# Patient Record
Sex: MALE | Race: WHITE | Employment: OTHER | URBAN - METROPOLITAN AREA
[De-identification: names, ages, dates, MRNs, and addresses within clinical notes are randomized per-mention and may not be internally consistent; named-entity substitution may affect disease eponyms.]

---

## 2019-08-22 ENCOUNTER — TELEPHONE (OUTPATIENT)
Dept: FAMILY MEDICINE CLINIC | Facility: CLINIC | Age: 79
End: 2019-08-22

## 2019-08-22 NOTE — TELEPHONE ENCOUNTER
Called patient's only phone number and it goes to an access code. Need to cancel appt 9/3/19 at 9 am. [facility] with the son for the patient to call.

## 2019-09-13 ENCOUNTER — LAB ENCOUNTER (OUTPATIENT)
Dept: LAB | Age: 79
End: 2019-09-13
Attending: FAMILY MEDICINE
Payer: MEDICARE

## 2019-09-13 ENCOUNTER — OFFICE VISIT (OUTPATIENT)
Dept: FAMILY MEDICINE CLINIC | Facility: CLINIC | Age: 79
End: 2019-09-13
Payer: MEDICARE

## 2019-09-13 VITALS
DIASTOLIC BLOOD PRESSURE: 72 MMHG | RESPIRATION RATE: 16 BRPM | HEIGHT: 61 IN | OXYGEN SATURATION: 99 % | TEMPERATURE: 98 F | BODY MASS INDEX: 19.03 KG/M2 | SYSTOLIC BLOOD PRESSURE: 112 MMHG | WEIGHT: 100.81 LBS | HEART RATE: 78 BPM

## 2019-09-13 DIAGNOSIS — R63.6 LOW WEIGHT: ICD-10-CM

## 2019-09-13 DIAGNOSIS — Z28.21 IMMUNIZATION NOT CARRIED OUT BECAUSE OF PATIENT REFUSAL: ICD-10-CM

## 2019-09-13 DIAGNOSIS — Z11.4 SCREENING FOR HIV (HUMAN IMMUNODEFICIENCY VIRUS): ICD-10-CM

## 2019-09-13 DIAGNOSIS — R79.89 ELEVATED FERRITIN: Primary | ICD-10-CM

## 2019-09-13 DIAGNOSIS — Z00.00 ENCOUNTER FOR MEDICARE ANNUAL WELLNESS EXAM: ICD-10-CM

## 2019-09-13 DIAGNOSIS — Z13.31 DEPRESSION SCREENING: ICD-10-CM

## 2019-09-13 DIAGNOSIS — Z00.00 ENCOUNTER FOR ANNUAL HEALTH EXAMINATION: ICD-10-CM

## 2019-09-13 DIAGNOSIS — Z23 FLU VACCINE NEED: ICD-10-CM

## 2019-09-13 DIAGNOSIS — Z13.39 SCREENING FOR ALCOHOL PROBLEM: ICD-10-CM

## 2019-09-13 DIAGNOSIS — Z23 NEED FOR VACCINATION: ICD-10-CM

## 2019-09-13 LAB
ALBUMIN SERPL-MCNC: 4.4 G/DL (ref 3.4–5)
ALBUMIN/GLOB SERPL: 1.4 {RATIO} (ref 1–2)
ALP LIVER SERPL-CCNC: 62 U/L (ref 45–117)
ALT SERPL-CCNC: 35 U/L (ref 16–61)
ANION GAP SERPL CALC-SCNC: 2 MMOL/L (ref 0–18)
AST SERPL-CCNC: 28 U/L (ref 15–37)
BILIRUB SERPL-MCNC: 0.8 MG/DL (ref 0.1–2)
BUN BLD-MCNC: 20 MG/DL (ref 7–18)
BUN/CREAT SERPL: 21.7 (ref 10–20)
CALCIUM BLD-MCNC: 9.7 MG/DL (ref 8.5–10.1)
CHLORIDE SERPL-SCNC: 107 MMOL/L (ref 98–112)
CHOLEST SMN-MCNC: 139 MG/DL (ref ?–200)
CO2 SERPL-SCNC: 30 MMOL/L (ref 21–32)
CREAT BLD-MCNC: 0.92 MG/DL (ref 0.7–1.3)
DEPRECATED HBV CORE AB SER IA-ACNC: 617.4 NG/ML (ref 30–530)
EST. AVERAGE GLUCOSE BLD GHB EST-MCNC: 128 MG/DL (ref 68–126)
GLOBULIN PLAS-MCNC: 3.2 G/DL (ref 2.8–4.4)
GLUCOSE BLD-MCNC: 96 MG/DL (ref 70–99)
HBA1C MFR BLD HPLC: 6.1 % (ref ?–5.7)
HDLC SERPL-MCNC: 38 MG/DL (ref 40–59)
IRON SATURATION: 33 % (ref 20–50)
IRON SERPL-MCNC: 119 UG/DL (ref 65–175)
LDLC SERPL CALC-MCNC: 87 MG/DL (ref ?–100)
M PROTEIN MFR SERPL ELPH: 7.6 G/DL (ref 6.4–8.2)
NONHDLC SERPL-MCNC: 101 MG/DL (ref ?–130)
OSMOLALITY SERPL CALC.SUM OF ELEC: 290 MOSM/KG (ref 275–295)
POTASSIUM SERPL-SCNC: 4.9 MMOL/L (ref 3.5–5.1)
PSA SERPL-MCNC: 2.4 NG/ML (ref ?–4)
SODIUM SERPL-SCNC: 139 MMOL/L (ref 136–145)
T4 FREE SERPL-MCNC: 1.2 NG/DL (ref 0.8–1.7)
TOTAL IRON BINDING CAPACITY: 359 UG/DL (ref 240–450)
TRANSFERRIN SERPL-MCNC: 241 MG/DL (ref 200–360)
TRIGL SERPL-MCNC: 68 MG/DL (ref 30–149)
TSI SER-ACNC: 2 MIU/ML (ref 0.36–3.74)
VLDLC SERPL CALC-MCNC: 14 MG/DL (ref 0–30)

## 2019-09-13 PROCEDURE — 82728 ASSAY OF FERRITIN: CPT | Performed by: FAMILY MEDICINE

## 2019-09-13 PROCEDURE — 84439 ASSAY OF FREE THYROXINE: CPT

## 2019-09-13 PROCEDURE — 84443 ASSAY THYROID STIM HORMONE: CPT

## 2019-09-13 PROCEDURE — 83550 IRON BINDING TEST: CPT | Performed by: FAMILY MEDICINE

## 2019-09-13 PROCEDURE — 36415 COLL VENOUS BLD VENIPUNCTURE: CPT | Performed by: FAMILY MEDICINE

## 2019-09-13 PROCEDURE — 83036 HEMOGLOBIN GLYCOSYLATED A1C: CPT

## 2019-09-13 PROCEDURE — 83540 ASSAY OF IRON: CPT | Performed by: FAMILY MEDICINE

## 2019-09-13 PROCEDURE — G0439 PPPS, SUBSEQ VISIT: HCPCS | Performed by: FAMILY MEDICINE

## 2019-09-13 PROCEDURE — 99497 ADVNCD CARE PLAN 30 MIN: CPT | Performed by: FAMILY MEDICINE

## 2019-09-13 PROCEDURE — 80053 COMPREHEN METABOLIC PANEL: CPT | Performed by: FAMILY MEDICINE

## 2019-09-13 PROCEDURE — 84153 ASSAY OF PSA TOTAL: CPT

## 2019-09-13 PROCEDURE — 80061 LIPID PANEL: CPT | Performed by: FAMILY MEDICINE

## 2019-09-13 PROCEDURE — 87389 HIV-1 AG W/HIV-1&-2 AB AG IA: CPT

## 2019-09-13 RX ORDER — ASPIRIN 81 MG/1
81 TABLET ORAL DAILY
Qty: 365 TABLET | Refills: 0 | Status: SHIPPED | OUTPATIENT
Start: 2019-09-13

## 2019-09-13 NOTE — PROGRESS NOTES
HPI:   Orvan Hodgkin is a 66year old male who presents for a Medicare Subsequent Annual Wellness visit (Pt already had Initial Annual Wellness).       His last annual assessment has been over 1 year: Annual Physical due on 10/08/1943       Known hx o Alcohol abuse and had a score of 0 so is at low risk.      Patient Care Team: Patient Care Team:  Orlando Dooley MD as PCP - General (Family Practice)    Patient Active Problem List:     Anxiety state, unspecified    Wt Readings from Last 3 Encounters: reports that he does not drink alcohol or use drugs.      REVIEW OF SYSTEMS:   GENERAL: feels well otherwise  SKIN: denies any unusual skin lesions  EYES: denies blurred vision or double vision  HEENT: denies nasal congestion, sinus pain or ST  LUNGS: denie S1, S2 normal, no murmur, rub or gallop   Abdomen:   Soft, non-tender, bowel sounds active all four quadrants,  no masses, no organomegaly   Extremities: Extremities normal, atraumatic, no cyanosis or edema   Pulses: 2+ and symmetric   Skin: Skin color, te Cancel: CREATINE KINASE (CK), MB  -     Cancel: RHEUMATOID ARTHRITIS FACTOR  -     PNEUMOCOCCAL VACC, 13 KYLE IM         Diet assessment: good     PLAN:  The patient indicates understanding of these issues and agrees to the plan.   Reinforced healthy diet preventive care reminders to display for this patient.   Update Health Maintenance if applicable     Immunizations (Update Immunization Activity if applicable)     Influenza  Covered Annually    Please get every year    Pneumococcal 13 (Prevnar)  Covered On

## 2019-09-13 NOTE — PATIENT INSTRUCTIONS
Zeyad Ramos's SCREENING SCHEDULE   Tests on this list are recommended by your physician but may not be covered, or covered at this frequency, by your insurer. Please check with your insurance carrier before scheduling to verify coverage.     PREVENT Covered every 5 years No results found for this or any previous visit. No flowsheet data found. Fecal Occult Blood   Covered Annually No results found for: FOB, OCCULTSTOOL No flowsheet data found.      Barium Enema-   uncomfortable but covered  Covered this or any previous visit. This may be covered with your pharmacy  prescription benefits     Recommended Websites for Advanced Directives    [email or web address]  An information packet, including necessary form from

## 2019-09-16 ENCOUNTER — TELEPHONE (OUTPATIENT)
Dept: FAMILY MEDICINE CLINIC | Facility: CLINIC | Age: 79
End: 2019-09-16

## 2019-09-16 NOTE — TELEPHONE ENCOUNTER
Pt is concerned about results from his 9/13/2019 blood draw. I let him know that they have not been reviewed yet by the MD.     He insists on getting a phone call back upon MD resulting the labs.

## 2019-09-17 ENCOUNTER — TELEPHONE (OUTPATIENT)
Dept: FAMILY MEDICINE CLINIC | Facility: CLINIC | Age: 79
End: 2019-09-17

## 2019-09-17 ENCOUNTER — APPOINTMENT (OUTPATIENT)
Dept: LAB | Age: 79
End: 2019-09-17
Attending: FAMILY MEDICINE
Payer: MEDICARE

## 2019-09-17 DIAGNOSIS — R79.89 ELEVATED FERRITIN: Primary | ICD-10-CM

## 2019-09-17 NOTE — TELEPHONE ENCOUNTER
Patients daughter states that patients previous PCP used to order a CEA due to abnormal ferritin level and patients age. States patient wants this test ordered.    States patient brought in the list of the test he needed done and this was one of test. Les

## 2019-09-17 NOTE — TELEPHONE ENCOUNTER
Patient picked up lab results print out. Unsure of what patient needs. Language Barrier present. Attempted to review lab results with patient, patient does not seem to understand the results. Contacted patients daughter who will contact patient.  She will

## 2019-09-17 NOTE — TELEPHONE ENCOUNTER
Pt states he didn't get a call regarding results, it states on labs results that he did yesterday. He stated that he talked to someone this morning. Please advise as patient is confused.

## 2019-09-18 ENCOUNTER — TELEPHONE (OUTPATIENT)
Dept: FAMILY MEDICINE CLINIC | Facility: CLINIC | Age: 79
End: 2019-09-18

## 2019-09-18 NOTE — TELEPHONE ENCOUNTER
Pt is calling because he states he was told someone will call him with the results of the CBC results. I let pt know that MD has not finalized the results and once that happens he will get a phone call from our nurses.     I am not sure if pt understands

## 2019-09-19 ENCOUNTER — TELEPHONE (OUTPATIENT)
Dept: FAMILY MEDICINE CLINIC | Facility: CLINIC | Age: 79
End: 2019-09-19

## 2019-09-19 NOTE — TELEPHONE ENCOUNTER
Patient is requesting his lab results. Lab results discussed with Patient in detail. Patient verbalized understanding with intent to comply.     Notes recorded by Fabio Self DO on 9/18/2019 at 7:57 PM CDT  Platelets are tad under normal but he is asy

## 2019-10-07 ENCOUNTER — APPOINTMENT (OUTPATIENT)
Dept: LAB | Age: 79
End: 2019-10-07
Attending: INTERNAL MEDICINE
Payer: MEDICARE

## 2019-10-07 DIAGNOSIS — R79.89 ELEVATED FERRITIN: ICD-10-CM

## 2019-10-07 PROCEDURE — 82378 CARCINOEMBRYONIC ANTIGEN: CPT

## 2019-10-07 PROCEDURE — 36415 COLL VENOUS BLD VENIPUNCTURE: CPT

## 2019-12-05 ENCOUNTER — OFFICE VISIT (OUTPATIENT)
Dept: FAMILY MEDICINE CLINIC | Facility: CLINIC | Age: 79
End: 2019-12-05
Payer: MEDICARE

## 2019-12-05 VITALS
OXYGEN SATURATION: 99 % | HEIGHT: 61 IN | RESPIRATION RATE: 16 BRPM | TEMPERATURE: 98 F | HEART RATE: 70 BPM | DIASTOLIC BLOOD PRESSURE: 70 MMHG | SYSTOLIC BLOOD PRESSURE: 112 MMHG | BODY MASS INDEX: 19 KG/M2 | WEIGHT: 100.63 LBS

## 2019-12-05 DIAGNOSIS — R42 DIZZINESS ON STANDING: Primary | ICD-10-CM

## 2019-12-05 PROCEDURE — 99213 OFFICE O/P EST LOW 20 MIN: CPT | Performed by: FAMILY MEDICINE

## 2019-12-05 RX ORDER — MECLIZINE HYDROCHLORIDE 25 MG/1
25 TABLET ORAL 3 TIMES DAILY PRN
Qty: 30 TABLET | Refills: 0 | Status: SHIPPED | OUTPATIENT
Start: 2019-12-05 | End: 2020-01-04

## 2019-12-05 NOTE — PROGRESS NOTES
Aminata Vazquez is a 78year old male , who presents for complaints of dizziness. He reorts that two days ago he had a single episode of dizzness when he got up in the middle of the night to urinate.  Denies headache, ear pain, chest pain, vision change, pain,denies heartburn  NEURO: denies headaches and denies numbness, tingling of feet         EXAM:  /70   Pulse 70   Temp 97.9 °F (36.6 °C) (Temporal)   Resp 16   Ht 61\"   Wt 100 lb 9.6 oz (45.6 kg)   SpO2 99%   BMI 19.01 kg/m²    GENERAL: well deve

## 2019-12-30 NOTE — TELEPHONE ENCOUNTER
triamcinolone acetonide (KENALOG) 0.1 % Apply Externally Cream    [id number] - [address], [phone]

## 2020-01-03 NOTE — TELEPHONE ENCOUNTER
Requested Prescriptions     Pending Prescriptions Disp Refills   • triamcinolone acetonide 0.1 % External Cream 45 g 3     Non protocol medication    LOV: 12/5/2019 for dizziness  RTC: PRN    Filled: 10/11/2013 #45g with 3 refills    Future Appointments

## 2020-01-04 ENCOUNTER — OFFICE VISIT (OUTPATIENT)
Dept: FAMILY MEDICINE CLINIC | Facility: CLINIC | Age: 80
End: 2020-01-04
Payer: MEDICARE

## 2020-01-04 VITALS
DIASTOLIC BLOOD PRESSURE: 70 MMHG | HEART RATE: 86 BPM | RESPIRATION RATE: 16 BRPM | WEIGHT: 100 LBS | OXYGEN SATURATION: 99 % | HEIGHT: 61 IN | SYSTOLIC BLOOD PRESSURE: 118 MMHG | BODY MASS INDEX: 18.88 KG/M2 | TEMPERATURE: 98 F

## 2020-01-04 DIAGNOSIS — K59.00 CONSTIPATION, UNSPECIFIED CONSTIPATION TYPE: Primary | ICD-10-CM

## 2020-01-04 PROCEDURE — 99213 OFFICE O/P EST LOW 20 MIN: CPT | Performed by: FAMILY MEDICINE

## 2020-01-04 RX ORDER — POLYETHYLENE GLYCOL 3350 17 G/17G
17 POWDER, FOR SOLUTION ORAL DAILY
Qty: 119 G | Refills: 0 | Status: SHIPPED | OUTPATIENT
Start: 2020-01-04 | End: 2020-01-11

## 2020-01-04 RX ORDER — SENNA AND DOCUSATE SODIUM 50; 8.6 MG/1; MG/1
1 TABLET, FILM COATED ORAL DAILY
Qty: 30 TABLET | Refills: 0 | Status: SHIPPED | OUTPATIENT
Start: 2020-01-04 | End: 2020-02-03

## 2020-01-04 NOTE — PATIENT INSTRUCTIONS
Constipation (Adult)  Constipation means that you have bowel movements that are less frequent than usual. Stools often become very hard and difficult to pass. Constipation is very common. At some point in life, it affects almost everyone.  Since everyone All treatment should be done after talking with your healthcare provider. This is especially true if you have another medical problems, are taking prescription medicines, or are an older adult. Treatment most often involves lifestyle changes.  You may also Call your healthcare provider right away if any of these occur:  · Fever of 100.4°F (38°C) or higher, or as directed by your healthcare provider  · Failure to resume normal bowel movements  · Pain in your abdomen or back gets worse  · Nausea or vomiting  · Exercise helps improve the working of your colon which helps ease constipation. Try to get some physical activity every day. If you haven’t been active for a while, talk to your healthcare provider before starting again.   Laxatives  Your healthcare provide

## 2020-01-04 NOTE — PROGRESS NOTES
HPI:   Naye Olguin is a 78year old male who presents for complaints of constipation. Pt has had for 2 weeks. . BM's are described as hard and patient has a great deal of straining. Pt does get crampy abdominal pain at times.  Pt admits to not eating Mother    • High Cholesterol Brother    • Diabetes Brother         REVIEW OF SYSTEMS:   GENERAL: feels well otherwise  SKIN: denies any unusual skin lesions  EYES: denies blurred vision or double vision  HEENT: denies nasal congestion, sinus pain or ST  TARIK maintenance after that. - Senna-Docusate Sodium 8.6-50 MG Oral Tab; Take 1 tablet by mouth daily. Dispense: 30 tablet; Refill: 0  - PEG 3350 Oral Powd Pack; Take 17 g by mouth daily for 7 days.   Dispense: 119 g; Refill: 0    The patient indicates unders

## 2020-05-13 ENCOUNTER — TELEPHONE (OUTPATIENT)
Dept: FAMILY MEDICINE CLINIC | Facility: CLINIC | Age: 80
End: 2020-05-13

## 2020-05-13 NOTE — TELEPHONE ENCOUNTER
Received a note from [address] [place] [address] stating patient requested they send a request for a refill on Alclometasone Dipropionate 0.05 % Ointment which is an old prescription from another prescriber that needs to be updated.     LOV: 1/4/20 for constipation    Pe

## 2020-05-14 NOTE — TELEPHONE ENCOUNTER
- 1st outreach to schedule appt. Pt states he will call back to schedule phone visit for medications.

## 2020-11-08 DIAGNOSIS — Z00.00 LABORATORY TESTS ORDERED AS PART OF A COMPLETE PHYSICAL EXAM (CPE): Primary | ICD-10-CM

## 2020-11-09 NOTE — TELEPHONE ENCOUNTER
Name from pharmacy: Triamcinolone Acetonide 0.1 % Cre [facility]          Will file in chart as: TRIAMCINOLONE ACETONIDE 0.1 % External Cream    Sig: APPLY TO AFFECTED AREA(S) TWO TIMES A DAY AS NEEDED    Disp:  45 g    Refills:  0    Start: 11/8/2020    Class:

## 2020-12-10 ENCOUNTER — OFFICE VISIT (OUTPATIENT)
Dept: FAMILY MEDICINE CLINIC | Facility: CLINIC | Age: 80
End: 2020-12-10
Payer: MEDICARE

## 2020-12-10 VITALS
WEIGHT: 99.38 LBS | OXYGEN SATURATION: 99 % | RESPIRATION RATE: 16 BRPM | DIASTOLIC BLOOD PRESSURE: 78 MMHG | HEART RATE: 74 BPM | SYSTOLIC BLOOD PRESSURE: 128 MMHG | HEIGHT: 61 IN | TEMPERATURE: 98 F | BODY MASS INDEX: 18.76 KG/M2

## 2020-12-10 DIAGNOSIS — Z12.5 SCREENING FOR PROSTATE CANCER: ICD-10-CM

## 2020-12-10 DIAGNOSIS — Z13.1 SCREENING FOR DIABETES MELLITUS (DM): ICD-10-CM

## 2020-12-10 DIAGNOSIS — Z12.11 SCREENING FOR COLON CANCER: ICD-10-CM

## 2020-12-10 DIAGNOSIS — Z13.31 DEPRESSION SCREENING: ICD-10-CM

## 2020-12-10 DIAGNOSIS — Z13.6 SCREENING FOR CARDIOVASCULAR CONDITION: ICD-10-CM

## 2020-12-10 DIAGNOSIS — Z00.00 ENCOUNTER FOR ANNUAL HEALTH EXAMINATION: ICD-10-CM

## 2020-12-10 DIAGNOSIS — R79.89 ELEVATED FERRITIN: Primary | ICD-10-CM

## 2020-12-10 PROCEDURE — G0439 PPPS, SUBSEQ VISIT: HCPCS | Performed by: FAMILY MEDICINE

## 2020-12-10 PROCEDURE — 99497 ADVNCD CARE PLAN 30 MIN: CPT | Performed by: FAMILY MEDICINE

## 2020-12-10 NOTE — PATIENT INSTRUCTIONS
Prevention Guidelines, Men Ages 72 and Older  Screening tests and vaccines are an important part of managing your health. A screening test is done to find possible disorders or diseases in people who don't have any symptoms.  The goal is to find a disease If you choose a test other than a colonoscopy and have an abnormal test result, you will need to have a colonoscopy. Screening recommendations vary among expert groups. Talk with your healthcare provider about which tests are best for you.   Some men should Vision All men in this age group Every 1 to 2 years; if you have a chronic health condition, ask your healthcare provider if you need exams more often   Vaccine Who needs it How often   Chickenpox (varicella) All men in this age group who have no record of Diet and exercise Men who are overweight or obese When diagnosed, and then at routine exams   Fall prevention (exercise, vitamin D supplements) All men in this age group At yearly routine exams   Sexually transmitted infection Men at increased risk for inf Cardiovascular Disease Screening     Cholesterol, covered every 5 yrs including Total, LDL and Trigs LDL Cholesterol (mg/dL)   Date Value   09/13/2019 87     Cholesterol, Total (mg/dL)   Date Value   09/13/2019 139     Triglycerides (mg/dL)   Date Value Covered Annually Orders placed or performed in visit on 09/13/19   • FLU VACC HIGH DOSE PRSV FREE    Please get every year    Pneumococcal 13 (Prevnar)  Covered Once after 65 Orders placed or performed in visit on 09/13/19   • PNEUMOCOCCAL VACC, 13 KYLE IM

## 2020-12-10 NOTE — PROGRESS NOTES
HPI:   Britney Rene is a [de-identified]year old male who presents for a Medicare Subsequent Annual Wellness visit (Pt already had Initial Annual Wellness).       His last annual assessment has been over 1 year: Annual Physical due on 10/08/1943       Known hx o screening   Marion Ramos was screened for Alcohol abuse and had a score of 0 so is at low risk.      Patient Care Team: Patient Care Team:  Everette Moreno DO as PCP - General (Family Medicine)  Everette Moreno DO as PCP [facility]    Patient Active Pr HISTORY:   He  reports that he has never smoked. He has never used smokeless tobacco. He reports that he does not drink alcohol or use drugs.      REVIEW OF SYSTEMS:   GENERAL: feels well otherwise  SKIN: denies any unusual skin lesions  EYES: denies blurre to auscultation bilaterally, respirations unlabored   Chest Wall:  No tenderness or deformity   Heart:  Regular rate and rhythm, S1, S2 normal, no murmur, rub or gallop   Abdomen:   Soft, non-tender, bowel sounds active all four quadrants,  no masses, no o Interaction    This section provided for quick review of chart, separate sheet to patient  [address] Internal Lab or Procedure External Lab or Procedure   Diabetes Screening      HbgA1C   Annually HgbA1C (%)   Date Inez injectable drug abusers     Tetanus Toxoid  Only covered with a cut with metal- TD and TDaP Not covered by Medicare Part B) No vaccine history found This may be covered with your prescription benefits, but Medicare does not cover unless Medically needed

## 2020-12-12 ENCOUNTER — LAB ENCOUNTER (OUTPATIENT)
Dept: LAB | Age: 80
End: 2020-12-12
Attending: FAMILY MEDICINE
Payer: MEDICARE

## 2020-12-12 DIAGNOSIS — Z00.00 LABORATORY TESTS ORDERED AS PART OF A COMPLETE PHYSICAL EXAM (CPE): ICD-10-CM

## 2020-12-12 DIAGNOSIS — Z12.5 SCREENING FOR PROSTATE CANCER: ICD-10-CM

## 2020-12-12 DIAGNOSIS — R79.89 ELEVATED FERRITIN: ICD-10-CM

## 2020-12-12 PROCEDURE — 80061 LIPID PANEL: CPT | Performed by: FAMILY MEDICINE

## 2020-12-12 PROCEDURE — 83036 HEMOGLOBIN GLYCOSYLATED A1C: CPT

## 2020-12-12 PROCEDURE — 85025 COMPLETE CBC W/AUTO DIFF WBC: CPT | Performed by: FAMILY MEDICINE

## 2020-12-12 PROCEDURE — 82378 CARCINOEMBRYONIC ANTIGEN: CPT

## 2020-12-12 PROCEDURE — 83540 ASSAY OF IRON: CPT | Performed by: FAMILY MEDICINE

## 2020-12-12 PROCEDURE — 84439 ASSAY OF FREE THYROXINE: CPT

## 2020-12-12 PROCEDURE — 82728 ASSAY OF FERRITIN: CPT | Performed by: FAMILY MEDICINE

## 2020-12-12 PROCEDURE — 83550 IRON BINDING TEST: CPT | Performed by: FAMILY MEDICINE

## 2020-12-12 PROCEDURE — 36415 COLL VENOUS BLD VENIPUNCTURE: CPT | Performed by: FAMILY MEDICINE

## 2020-12-12 PROCEDURE — 80053 COMPREHEN METABOLIC PANEL: CPT | Performed by: FAMILY MEDICINE

## 2020-12-12 PROCEDURE — 84443 ASSAY THYROID STIM HORMONE: CPT

## 2020-12-17 DIAGNOSIS — R97.0 ELEVATED CARCINOEMBRYONIC ANTIGEN (CEA): Primary | ICD-10-CM

## 2021-01-18 DIAGNOSIS — Z00.00 LABORATORY TESTS ORDERED AS PART OF A COMPLETE PHYSICAL EXAM (CPE): ICD-10-CM

## 2021-01-18 RX ORDER — ALPRAZOLAM 1 MG/1
1 TABLET ORAL 3 TIMES DAILY
Qty: 30 TABLET | Refills: 1 | Status: SHIPPED | OUTPATIENT
Start: 2021-01-18 | End: 2021-03-01

## 2021-01-18 NOTE — TELEPHONE ENCOUNTER
Pt will need refill on the Alprazolam-he would like a 3 month prescription.  Please send to local [facility].

## 2021-01-18 NOTE — TELEPHONE ENCOUNTER
Medication last refilled on 9/24/2020 with a QTY: 90 with 3 refills. No future appointments. Medication pended if okay to refill.

## 2021-01-18 NOTE — TELEPHONE ENCOUNTER
Name from pharmacy: Triamcinolone Acetonide 0.1 % Cre [facility]          Will file in chart as: TRIAMCINOLONE ACETONIDE 0.1 % External Cream    Sig: APPLY TO AFFECTED AREA TWO TIMES A DAY AS NEEDED     Disp:  45 g    Refills:  0    Start: 1/18/2021    Class: No

## 2021-02-02 ENCOUNTER — TELEPHONE (OUTPATIENT)
Dept: FAMILY MEDICINE CLINIC | Facility: CLINIC | Age: 81
End: 2021-02-02

## 2021-02-02 NOTE — TELEPHONE ENCOUNTER
Spoke to pt's son, advised that MyChart notifications for the Covid-19 vaccine are not coming directly from our office.  Advised that [facility] is working as quickly at they can to get everyone vaccinated but it will take some time to move through [place]

## 2021-02-02 NOTE — TELEPHONE ENCOUNTER
Patient's son wants to know why he didn't get a link to schedule a vaccine when the patient's spouse did. He would like someone to look into the chart and let him know, he is planning on bringing them at the same time, please advise.

## 2021-03-01 ENCOUNTER — OFFICE VISIT (OUTPATIENT)
Dept: FAMILY MEDICINE CLINIC | Facility: CLINIC | Age: 81
End: 2021-03-01
Payer: MEDICARE

## 2021-03-01 ENCOUNTER — TELEPHONE (OUTPATIENT)
Dept: FAMILY MEDICINE CLINIC | Facility: CLINIC | Age: 81
End: 2021-03-01

## 2021-03-01 VITALS
BODY MASS INDEX: 18.88 KG/M2 | HEIGHT: 61 IN | TEMPERATURE: 97 F | RESPIRATION RATE: 16 BRPM | OXYGEN SATURATION: 99 % | HEART RATE: 79 BPM | SYSTOLIC BLOOD PRESSURE: 135 MMHG | WEIGHT: 100 LBS | DIASTOLIC BLOOD PRESSURE: 80 MMHG

## 2021-03-01 DIAGNOSIS — R03.0 ELEVATED BLOOD PRESSURE READING IN OFFICE WITH WHITE COAT SYNDROME, WITHOUT DIAGNOSIS OF HYPERTENSION: Primary | ICD-10-CM

## 2021-03-01 DIAGNOSIS — F41.9 ANXIETY: ICD-10-CM

## 2021-03-01 DIAGNOSIS — R97.0 ELEVATED CEA: ICD-10-CM

## 2021-03-01 PROCEDURE — 99214 OFFICE O/P EST MOD 30 MIN: CPT | Performed by: FAMILY MEDICINE

## 2021-03-01 RX ORDER — ALPRAZOLAM 1 MG/1
1 TABLET ORAL NIGHTLY PRN
Qty: 90 TABLET | Refills: 0 | Status: SHIPPED | OUTPATIENT
Start: 2021-03-01 | End: 2021-03-01

## 2021-03-01 RX ORDER — ALPRAZOLAM 1 MG/1
1 TABLET ORAL 3 TIMES DAILY PRN
Qty: 90 TABLET | Refills: 0 | Status: SHIPPED | OUTPATIENT
Start: 2021-03-01 | End: 2021-03-31

## 2021-03-01 RX ORDER — ALPRAZOLAM 1 MG/1
1 TABLET ORAL 3 TIMES DAILY PRN
Qty: 90 TABLET | Refills: 0 | Status: SHIPPED | OUTPATIENT
Start: 2021-03-01 | End: 2021-03-01

## 2021-03-01 RX ORDER — ALPRAZOLAM 1 MG/1
1 TABLET ORAL 3 TIMES DAILY
Qty: 90 TABLET | Refills: 0 | Status: CANCELLED | OUTPATIENT
Start: 2021-03-01

## 2021-03-01 NOTE — PROGRESS NOTES
Patient presents with: Follow - Up: here to review and discuss meds       HPI:  44-year-old male patient has a history of an elevated CEA and has seen gastroenterology for this.   He was encouraged to get a colonoscopy for screening of colon cancer but he syncope, weakness, numbness, tingling and headaches. Hematological: Negative for adenopathy. Does not bruise/bleed easily. Psychiatric/Behavioral: The patient is nervous/anxious. No depression.     Patient Active Problem List:     Anxiety state, unspeci Pneumovax             09/13/2019    Deferred                Date(s) Deferred    FLU VAC High Dose 65 YRS & Older PRSV Free (85560)                          09/13/2019      Influenza Vaccine Refused                          09/13/2019        Physical Exam Physical Activity    3. Anxiety  Discussed medication dosage, usage, side effects, and goals of treatment in detail. Anxiety - at goal, no suicidal or homicidal thoughts. Continue present management. Patient will call if any symptoms worsen.  Patient under

## 2021-03-01 NOTE — TELEPHONE ENCOUNTER
July 9, 2020       Michael Koroma MD  [address] [facility] [address]  VIA In Basket      Patient: Gabriel Champion   YOB: 1954   Date of Visit: 7/9/2020       Dear Dr. Koroma:    Thank you for referring Gabriel Champion to me for evaluation. Below are my notes for this visit with him.    If you have questions, please do not hesitate to call me. I look forward to following your patient along with you.      Sincerely,        Fern Hart MD        CC: No Recipients  Fern Hart MD  7/9/2020  3:46 PM  Sign when Signing Visit    Telephone Visit  25 minutes spent in direct communication and coordination of care, reviewing past records and recent testings     Given the ongoing coronavirus pandemic and institution of social distancing to protect vulnerable patients ,the visit was converted to a telephone visit.  Patient is aware of the public health concerns,  understands and agrees with a telephone visit and understands the limitations of a telephone call compared to a regular  face-to-face visit .    The following were addressed by physician and nurse on this telephone visit    Past medical history, chief complaint ,medications, social history, allergies  History , current complain, review of system  Home blood pressure and heart rate monitoring    Patient is home and agrees to telephone visit understanding the limitations of a telephone call compared to a regular face-to-face visit  Tests reviewed : stress test    ASSESSMENT     Coronary artery disease involving native coronary artery of native heart without angina pectoris  No evidence of angina with about 6M ETS.  Gabriel became very anxious and scared with the COVID situation canceled the appointment today and talking to me on the phone expressed concerns about getting out of the house.  He understands the partially abnormal stress test and states that he is okay to go outside and walk going at least 1 flight of stairs without any chest discomfort.  I  Patient states Xanax is supposed to be 3 x day. He takes it morning, noon & night, please advise. would suggest a follow-up in the office to further evaluate for possible symptoms.  He understood and told me that when he feels safe from COVID standpoint he will call us and make a follow-up appointment  Summary:     1. Myocardial perfusion imaging: There is a medium-sized, moderately     severe, partially reversible defect involving the mid anterolateral and     apical lateral wall(s), consistent with infarction with mild to     moderate periinfarct ischemia.  2. Gated SPECT: The calculated left ventricular ejection fraction is 48%.     There is hypokinesis involving the apical lateral wall of the left     ventricle.  3. Stress ECG conclusions: Occasional ventricular ectopy. Nonspecific     ST/T. The stress ECG is negative for ischemia.  4. Baseline ECG: Normal sinus rhythm.  Nonrheumatic aortic valve stenosis  Moderate  Orthostatic hypotension dysautonomic syndrome (CMS/HCC)  Off ACE inhibitors          History and Physical:  Chief Complaint   Patient presents with   • Follow-up     Prescription refill (metoprolol), Abd stress test     Gabriel Champion is a 65 year old male with a history of  has a past medical history of Aortic stenosis, Chronic diastolic heart failure (CMS/HCC), Coronary atherosclerosis of native coronary artery, History of coronary artery bypass surgery, Hyperlipidemia, and Hypotension.    Past Medical History:     Past Medical History:   Diagnosis Date   • Aortic stenosis    • Chronic diastolic heart failure (CMS/HCC)    • Coronary atherosclerosis of native coronary artery    • History of coronary artery bypass surgery    • Hyperlipidemia    • Hypotension        Social History     Tobacco Use   • Smoking status: Former Smoker     Packs/day: 0.00   • Smokeless tobacco: Never Used   Substance Use Topics   • Alcohol use: Not Currently   • Drug use: Never     Family History   Problem Relation Age of Onset   • Other Mother         CABG   • Pacemaker Brother        Current Outpatient Medications    Medication Sig Dispense Refill   • metoPROLOL tartrate (LOPRESSOR) 25 MG tablet Take 0.5 tablets by mouth daily. 45 tablet 3   • simvastatin (ZOCOR) 20 MG tablet TAKE 1 TABLET BY MOUTH EVERYDAY AT BEDTIME 90 tablet 2   • sertraline (ZOLOFT) 100 MG tablet TAKE 1 TABLET BY MOUTH EVERY DAY 90 tablet 2     No current facility-administered medications for this visit.         Review Of Symptoms:     CVS: No orthopnea, paroxysmal nocturnal dyspnea,   No palpitations, dizziness, syncope, or diaphoresis. No exertional typical chest pain, neck, jaw, or arm pain.  Peripheral Vascular disease: Denies intermittent claudications, restless leg syndrome, painful varicose veins.  GI: No history of epigastric pain, nausea, vomiting, hematemesis, or melena. No diarrhea or constipation.  Respiratory: No history of recent fever with chills, purulent expectoration, or hemoptysis.  No history of excessive snoring or day time sleepiness.  CNS: No history of headache, focal weakness, or visual disturbances.  MS: No joint pain, redness, or swelling.  Constitutional: No history of weight loss, loss of appetite, or weight gain.  : No history of flank pain or hematuria.  Endocrinology: No history of polyuria, polydipsia, polyphagia, or hot flashes.  ENT: No sore throat, difficulty swallowing, or other significant abnormalities.  Psych:  Adult Depression Screening: Result- Negative. Denies feeling down, depressed, or hopeless. Denies feeling little pleasure or interest in doing activities over last 2 weeks.   Skin: no new rashes, lesions, or pruritic.      Fern Hart MD, RPVI, ABVLM Diplomate    American College of Cardiology.  American Board of Nuclear Medicine.  ARDMS , Registered Physician Vascular Interpretation.  American Board of Vein and Lymphatic Medicine

## 2021-03-03 PROBLEM — R97.0 ELEVATED CEA: Status: ACTIVE | Noted: 2021-03-03

## 2021-03-03 PROBLEM — R03.0 ELEVATED BLOOD PRESSURE READING IN OFFICE WITH WHITE COAT SYNDROME, WITHOUT DIAGNOSIS OF HYPERTENSION: Status: ACTIVE | Noted: 2021-03-03

## 2021-03-03 PROBLEM — F41.9 ANXIETY: Status: ACTIVE | Noted: 2021-03-03

## 2021-03-24 DIAGNOSIS — Z00.00 LABORATORY TESTS ORDERED AS PART OF A COMPLETE PHYSICAL EXAM (CPE): ICD-10-CM

## 2021-03-25 NOTE — TELEPHONE ENCOUNTER
Requested Prescriptions     Name from pharmacy: Triamcinolone Acetonide 0.1 % Cre Perr         Will file in chart as: TRIAMCINOLONE ACETONIDE 0.1 % External Cream    Sig: APPLY TO AFFECTED AREA TWO TIMES A DAY AS NEEDED    Disp:  45 g    Refills:  0    Sta

## 2021-03-25 NOTE — TELEPHONE ENCOUNTER
Dr. Mitchell Rock- Pt is requesting refill.     [facility] ., [address] [place] [address], [phone]   Outpatient Medication Detail     Disp Refills Start End    triamcinolone acetonide 0.1 % External Cream 45 g 0 1/18/2021     Sig: DELICIA

## 2021-04-20 ENCOUNTER — TELEPHONE (OUTPATIENT)
Dept: FAMILY MEDICINE CLINIC | Facility: CLINIC | Age: 81
End: 2021-04-20

## 2021-04-20 DIAGNOSIS — R79.89 ELEVATED FERRITIN: Primary | ICD-10-CM

## 2021-04-20 NOTE — TELEPHONE ENCOUNTER
See phone message below:    Pt scheduled appt for 6/7/21. He is requesting lab orders prior to appointment. Last labs done 12/12/2020. He is requesting order for CEA as well. CEA is already ordered.  Do you want him to repeat any other labs prior to appo

## 2021-04-20 NOTE — TELEPHONE ENCOUNTER
I spoke to pt, informed of MD recommendations below. Labs ordered. luma-idhart sent with contact information for central scheduling.

## 2021-04-20 NOTE — TELEPHONE ENCOUNTER
- Pt is requesting complete blood work up to be done with CEA. Future Appointments   Date Time Provider Li Nuris   6/7/2021 10:00 AM Bret Self, DO [facility] [address] [facility] [address]     Please call patient when order is placed.

## 2021-04-21 ENCOUNTER — PATIENT MESSAGE (OUTPATIENT)
Dept: FAMILY MEDICINE CLINIC | Facility: CLINIC | Age: 81
End: 2021-04-21

## 2021-04-21 DIAGNOSIS — Z83.79 FAMILY HISTORY OF CELIAC DISEASE: Primary | ICD-10-CM

## 2021-04-21 NOTE — TELEPHONE ENCOUNTER
From: Miguel Zambrano  To: Ilene James DO  Sent: 4/21/2021 12:56 PM CDT  Subject: Visit Follow-up Question    Hello  My dad is scheduled next week for an appt . He requested all his blood work redone.  However since he only had them done 3 months ago

## 2021-04-28 ENCOUNTER — LAB ENCOUNTER (OUTPATIENT)
Dept: LAB | Age: 81
End: 2021-04-28
Attending: FAMILY MEDICINE
Payer: MEDICARE

## 2021-04-28 DIAGNOSIS — Z83.79 FAMILY HISTORY OF CELIAC DISEASE: ICD-10-CM

## 2021-04-28 PROCEDURE — 36415 COLL VENOUS BLD VENIPUNCTURE: CPT | Performed by: FAMILY MEDICINE

## 2021-04-28 PROCEDURE — 85025 COMPLETE CBC W/AUTO DIFF WBC: CPT | Performed by: FAMILY MEDICINE

## 2021-04-28 PROCEDURE — 83550 IRON BINDING TEST: CPT | Performed by: FAMILY MEDICINE

## 2021-04-28 PROCEDURE — 83516 IMMUNOASSAY NONANTIBODY: CPT

## 2021-04-28 PROCEDURE — 82728 ASSAY OF FERRITIN: CPT | Performed by: FAMILY MEDICINE

## 2021-04-28 PROCEDURE — 83540 ASSAY OF IRON: CPT | Performed by: FAMILY MEDICINE

## 2021-04-30 ENCOUNTER — TELEPHONE (OUTPATIENT)
Dept: FAMILY MEDICINE CLINIC | Facility: CLINIC | Age: 81
End: 2021-04-30

## 2021-04-30 NOTE — TELEPHONE ENCOUNTER
See information below, we will call pt with results when Dr. Jaswinder Calvillo reviews all completed results and provides review and recommendations which may take 3-5 business days. Thank you.

## 2021-05-03 NOTE — TELEPHONE ENCOUNTER
Future Appointments   Date Time Provider Li Lawler   6/4/2021  9:30 AM Tonja Blood, DO [facility] [address] [facility] [address]

## 2021-05-04 ENCOUNTER — TELEPHONE (OUTPATIENT)
Dept: FAMILY MEDICINE CLINIC | Facility: CLINIC | Age: 81
End: 2021-05-04

## 2021-05-04 NOTE — TELEPHONE ENCOUNTER
The lab did not elder his CEA because we decided to follow up on it in 6 months so it is due in June. If he wants it earlier then he will need to get it drawn again. Also I do not see the point of trending it if we do not have a diagnosis.  It was advised by

## 2021-05-04 NOTE — TELEPHONE ENCOUNTER
[employer] [place] message sent. Please schedule pt for appointment to discuss results with Dr. Tabatha Paniagua if he calls back to discuss.

## 2021-05-04 NOTE — TELEPHONE ENCOUNTER
Patient states he would like lab results, he's not feeling well, has diarrhea, maybe the flu, declined an appt, please advise.

## 2021-05-10 ENCOUNTER — OFFICE VISIT (OUTPATIENT)
Dept: HEMATOLOGY/ONCOLOGY | Age: 81
End: 2021-05-10
Attending: INTERNAL MEDICINE
Payer: MEDICARE

## 2021-05-10 VITALS
DIASTOLIC BLOOD PRESSURE: 82 MMHG | HEART RATE: 52 BPM | OXYGEN SATURATION: 98 % | WEIGHT: 97.38 LBS | TEMPERATURE: 97 F | BODY MASS INDEX: 18 KG/M2 | RESPIRATION RATE: 18 BRPM | SYSTOLIC BLOOD PRESSURE: 154 MMHG

## 2021-05-10 DIAGNOSIS — D69.6 THROMBOCYTOPENIA (HCC): Primary | ICD-10-CM

## 2021-05-10 PROCEDURE — 99203 OFFICE O/P NEW LOW 30 MIN: CPT | Performed by: INTERNAL MEDICINE

## 2021-05-10 RX ORDER — ALPRAZOLAM 0.5 MG/1
0.5 TABLET ORAL 3 TIMES DAILY PRN
COMMUNITY
End: 2021-10-29

## 2021-05-10 RX ORDER — LATANOPROST 50 UG/ML
SOLUTION/ DROPS OPHTHALMIC
COMMUNITY
Start: 2021-04-07

## 2021-05-10 NOTE — CONSULTS
[facility] Report of Consultation    Patient Name: Ave Vizcaino   YOB: 1940   Medical Record Number: [id number]   CSN: [id number]   Consulting Physician: Sergio Hogan MD  Referring Physician(s): Maryjo Almanza DO    Date of Consu Not Asked        Exercise: Not Asked        Seat Belt: Not Asked        Special Diet: Not Asked        Stress Concern: Not Asked        Weight Concern: Not Asked    Social History Narrative      Not on file    Social Determinants of Health  Financial Resou wt loss  Eyes: No visual disturbance, irritation and redness. Ears, nose, mouth, throat, and face: No hearing loss, tinnitus, hoarseness and voice change.   Respiratory: No cough, sputum, hemoptysis, chest pain, wheezing, dyspnea on exertion  Cardiovascula If platelets drop, can consider additional testing. Since no treatment is indicated at this time, would not do any aggressive testing or bone marrow biopsy as mild MDS is a possibility given his age.     They will get labs every 6 months and contact me if

## 2021-05-10 NOTE — PROGRESS NOTES
MD consult, referred for abnormal labs by Dr Colby Fox. Pt feels well.    Education Record    Learner:  Patient/ family member  Disease / [facility], [place] labs    Barriers / Limitations:  None   Comments:    Method:  Discussion   Comments:    General Top

## 2021-05-11 DIAGNOSIS — Z00.00 LABORATORY TESTS ORDERED AS PART OF A COMPLETE PHYSICAL EXAM (CPE): ICD-10-CM

## 2021-06-02 ENCOUNTER — OFFICE VISIT (OUTPATIENT)
Dept: FAMILY MEDICINE CLINIC | Facility: CLINIC | Age: 81
End: 2021-06-02
Payer: MEDICARE

## 2021-06-02 VITALS
RESPIRATION RATE: 16 BRPM | SYSTOLIC BLOOD PRESSURE: 150 MMHG | HEART RATE: 77 BPM | TEMPERATURE: 98 F | DIASTOLIC BLOOD PRESSURE: 80 MMHG | BODY MASS INDEX: 18.5 KG/M2 | WEIGHT: 98 LBS | OXYGEN SATURATION: 99 % | HEIGHT: 61 IN

## 2021-06-02 DIAGNOSIS — F41.9 ANXIETY: ICD-10-CM

## 2021-06-02 DIAGNOSIS — R03.0 ELEVATED BLOOD PRESSURE READING IN OFFICE WITH WHITE COAT SYNDROME, WITHOUT DIAGNOSIS OF HYPERTENSION: ICD-10-CM

## 2021-06-02 DIAGNOSIS — R97.0 ELEVATED CEA: Primary | ICD-10-CM

## 2021-06-02 PROCEDURE — 99213 OFFICE O/P EST LOW 20 MIN: CPT | Performed by: FAMILY MEDICINE

## 2021-06-02 NOTE — PROGRESS NOTES
Patient presents with:  Lab Results: recent blood tests and has questions regarding referrals. HPI:  Patient is here to discuss recent health concerns. He is depressed and anxious. Here with daughter.  He refusesthough to be on any medication aside fro abdominal distention. Neurological: Negative for dizziness, syncope, weakness, numbness, tingling and headaches. Hematological: Negative for adenopathy. Does not bruise/bleed easily. Psychiatric/Behavioral: The patient is  nervous/anxious.  +depressio taking: Reported on 6/2/2021 ) 365 tablet 0       Allergies  No Known Allergies    Health Maintenance  Immunizations:    Immunization History  Administered            Date(s) Administered    Pneumococcal (Prevnar 13)                          09/13/2019 syndrome, without diagnosis of hypertension  With elevated CEA. Advised to get the lab redrawn and to schedule colonoscopy with Dr. Graciela Rosas that this could be a marker for malignancy.   As for his decrease in his weight I do suspect that it is due to h

## 2021-06-03 ENCOUNTER — TELEPHONE (OUTPATIENT)
Dept: FAMILY MEDICINE CLINIC | Facility: CLINIC | Age: 81
End: 2021-06-03

## 2021-06-03 RX ORDER — ALPRAZOLAM 1 MG/1
TABLET ORAL
Qty: 90 TABLET | Refills: 0 | Status: SHIPPED | OUTPATIENT
Start: 2021-06-03 | End: 2021-07-06

## 2021-06-03 NOTE — TELEPHONE ENCOUNTER
Discussed anxiety meds at appt yesterday and have decided to start those and would like prescription called in

## 2021-06-03 NOTE — PATIENT INSTRUCTIONS
Anxiety Reaction  Anxiety is the feeling we all get when we think something bad might happen. It is a normal response to stress and normally causes only a mild reaction. When anxiety becomes more severe, it can interfere with daily life.  In some cases, y your anxiety. These include simple things such as exercise, good nutrition, and adequate rest. Also, there are certain techniques that are helpful:  ? Relaxation  ? Breathing exercises  ? Visualization  ? Biofeedback  ?  Meditation  For more information abo

## 2021-06-03 NOTE — TELEPHONE ENCOUNTER
Name from pharmacy: Alprazolam 1 Mg Tab Acta         Will file in chart as: ALPRAZOLAM 1 MG Oral Tab    The original prescription was reordered on 3/1/2021 by Edison Meng DO. Renewing this prescription may not be appropriate.     Sig: Take one tablet

## 2021-06-04 RX ORDER — ESCITALOPRAM OXALATE 10 MG/1
10 TABLET ORAL DAILY
Qty: 90 TABLET | Refills: 0 | Status: SHIPPED | OUTPATIENT
Start: 2021-06-04 | End: 2021-09-09

## 2021-06-04 NOTE — TELEPHONE ENCOUNTER
Okay, will send in laxepro. Please explain  risks that anti-depressants as well as anti-anxiety agents have been shown to paradoxically worsen anxiety and depression and trigger suicidal thoughts.  If any of these thoughts are present patient will stop

## 2021-07-06 RX ORDER — ALPRAZOLAM 1 MG/1
TABLET ORAL
Qty: 90 TABLET | Refills: 0 | Status: SHIPPED | OUTPATIENT
Start: 2021-07-06 | End: 2021-09-09

## 2021-07-06 NOTE — TELEPHONE ENCOUNTER
Requesting Alprazolam 1mg  LOV: 6/2/21  RTC: 4 weeks  Last Relevant Labs: 4/28/21  Filled: 6/3/21 #90 with 0 refills    No future appointments.     Per [place] , last dispensed 6/3/21 #90    Rx pended and routed for approval/denial

## 2021-07-22 ENCOUNTER — OFFICE VISIT (OUTPATIENT)
Dept: FAMILY MEDICINE CLINIC | Facility: CLINIC | Age: 81
End: 2021-07-22
Payer: MEDICARE

## 2021-07-22 VITALS
BODY MASS INDEX: 18.5 KG/M2 | WEIGHT: 98 LBS | DIASTOLIC BLOOD PRESSURE: 82 MMHG | HEART RATE: 76 BPM | RESPIRATION RATE: 16 BRPM | TEMPERATURE: 98 F | SYSTOLIC BLOOD PRESSURE: 144 MMHG | OXYGEN SATURATION: 99 % | HEIGHT: 61 IN

## 2021-07-22 DIAGNOSIS — R35.0 URINARY FREQUENCY: Primary | ICD-10-CM

## 2021-07-22 DIAGNOSIS — Z00.00 LABORATORY TESTS ORDERED AS PART OF A COMPLETE PHYSICAL EXAM (CPE): ICD-10-CM

## 2021-07-22 LAB
APPEARANCE: CLEAR
BILIRUBIN: NEGATIVE
GLUCOSE (URINE DIPSTICK): NEGATIVE MG/DL
KETONES (URINE DIPSTICK): NEGATIVE MG/DL
LEUKOCYTES: NEGATIVE
MULTISTIX LOT#: 5077 NUMERIC
NITRITE, URINE: NEGATIVE
OCCULT BLOOD: NEGATIVE
PH, URINE: 5.5 (ref 4.5–8)
PROTEIN (URINE DIPSTICK): NEGATIVE MG/DL
SPECIFIC GRAVITY: 1.03 (ref 1–1.03)
URINE-COLOR: YELLOW
UROBILINOGEN,SEMI-QN: 0.2 MG/DL (ref 0–1.9)

## 2021-07-22 PROCEDURE — 87077 CULTURE AEROBIC IDENTIFY: CPT | Performed by: FAMILY MEDICINE

## 2021-07-22 PROCEDURE — 81003 URINALYSIS AUTO W/O SCOPE: CPT | Performed by: FAMILY MEDICINE

## 2021-07-22 PROCEDURE — 87086 URINE CULTURE/COLONY COUNT: CPT | Performed by: FAMILY MEDICINE

## 2021-07-22 PROCEDURE — 87186 SC STD MICRODIL/AGAR DIL: CPT | Performed by: FAMILY MEDICINE

## 2021-07-22 PROCEDURE — 99213 OFFICE O/P EST LOW 20 MIN: CPT | Performed by: FAMILY MEDICINE

## 2021-07-22 RX ORDER — TAMSULOSIN HYDROCHLORIDE 0.4 MG/1
0.4 CAPSULE ORAL DAILY
Qty: 30 CAPSULE | Refills: 0 | Status: SHIPPED | OUTPATIENT
Start: 2021-07-22 | End: 2021-08-21

## 2021-07-22 NOTE — PROGRESS NOTES
CHIEF COMPLAINT:   Patient presents with:  Urinary Frequency: Concerns with prostate- he also changed his lexapro instead of taking medication in the morning, he is taking it in the evening before bed and thats when symptoms started      GALLITO:   Tish Ch Smokeless tobacco: Never Used    Vaping Use      Vaping Use: Never used    Alcohol use: No    Drug use: No        REVIEW OF SYSTEMS:   GENERAL: Denies fever, chills, or body aches  SKIN: no rashes, no skin wounds or ulcers. GI: See HPI. : See HPI.   CLAUDIA worsening. Patient Instructions     BPH (Enlarged Prostate)   The prostate is a gland at the base of the bladder. As some men get older, the prostate may get bigger. This problem is called benign prostatic hyperplasia (BPH).  BPH puts pressure on the ur prostate cancer is a common cancer in men, screening is sometimes advised. This may help find the cancer in its early stages when treatment is most effective.  Factors that can increase the risk of prostate cancer include being  or having a

## 2021-07-24 RX ORDER — NITROFURANTOIN 25; 75 MG/1; MG/1
100 CAPSULE ORAL 2 TIMES DAILY
Qty: 14 CAPSULE | Refills: 0 | Status: SHIPPED | OUTPATIENT
Start: 2021-07-24 | End: 2021-07-31

## 2021-09-09 RX ORDER — ALPRAZOLAM 1 MG/1
TABLET ORAL
Qty: 90 TABLET | Refills: 0 | Status: SHIPPED | OUTPATIENT
Start: 2021-09-09

## 2021-09-09 RX ORDER — ESCITALOPRAM OXALATE 10 MG/1
10 TABLET ORAL DAILY
Qty: 90 TABLET | Refills: 0 | Status: SHIPPED | OUTPATIENT
Start: 2021-09-09 | End: 2021-12-21

## 2021-09-09 NOTE — TELEPHONE ENCOUNTER
Dr.Mohammed- Arreaga is calling to request two refills.     Ethan Mason., [address] [phone], [phone]   Outpatient Medication Detail     Disp Refills Start End    escitalopram 10 MG Oral Tab 90 tablet 0 6/4/2021 9/2/2021    S

## 2021-09-09 NOTE — TELEPHONE ENCOUNTER
Name from pharmacy: Escitalopram Oxalate 10 Mg Tab Auro          Will file in chart as: ESCITALOPRAM 10 MG Oral Tab    Sig: Take 1 tablet (10 mg total) by mouth daily.     Disp:  90 tablet    Refills:  0    Start: 9/7/2021    Class: Normal    Non-formulary

## 2021-10-28 ENCOUNTER — TELEPHONE (OUTPATIENT)
Dept: FAMILY MEDICINE CLINIC | Facility: CLINIC | Age: 81
End: 2021-10-28

## 2021-10-28 NOTE — TELEPHONE ENCOUNTER
Daughter calling about patient, patient taking Alprazolam and Escitalopram. Daughter has noticed patient experiencing hallucinations. Episodes getting worse, per daughter patient states the tv is talking to him.  Daughter concerned, please advise

## 2021-10-28 NOTE — TELEPHONE ENCOUNTER
Aiden Boss states pt has been having hallucincations x 3 weeks, states they have been increasing over the last few days. Aiden Boss asking if it is pt's medications, advised that it could be several things including UTI.  Aiden Boss states she is concerned it may be a

## 2021-10-29 ENCOUNTER — OFFICE VISIT (OUTPATIENT)
Dept: FAMILY MEDICINE CLINIC | Facility: CLINIC | Age: 81
End: 2021-10-29
Payer: MEDICARE

## 2021-10-29 VITALS
BODY MASS INDEX: 18.5 KG/M2 | SYSTOLIC BLOOD PRESSURE: 148 MMHG | TEMPERATURE: 98 F | HEIGHT: 61 IN | HEART RATE: 88 BPM | OXYGEN SATURATION: 99 % | WEIGHT: 98 LBS | DIASTOLIC BLOOD PRESSURE: 88 MMHG | RESPIRATION RATE: 16 BRPM

## 2021-10-29 DIAGNOSIS — R41.0 DISORIENTATION: Primary | ICD-10-CM

## 2021-10-29 DIAGNOSIS — R25.1 TREMOR: ICD-10-CM

## 2021-10-29 PROCEDURE — 87086 URINE CULTURE/COLONY COUNT: CPT | Performed by: FAMILY MEDICINE

## 2021-10-29 PROCEDURE — 99214 OFFICE O/P EST MOD 30 MIN: CPT | Performed by: FAMILY MEDICINE

## 2021-10-29 PROCEDURE — 81003 URINALYSIS AUTO W/O SCOPE: CPT | Performed by: FAMILY MEDICINE

## 2021-10-29 NOTE — PROGRESS NOTES
Patient presents with:  Eval-P: x2 weeks pts daughter has noticed a cognitive decline and that he is hallucinating       HPI:   patient brought in by his daughter for concern about his memory declining rapidly and hallucinations in th elast 3 weeks.      Ge hypertension     Elevated CEA      Past Medical History:   Diagnosis Date   • Anxiety    • Anxiety state, unspecified    • Glaucoma    • History of depression    • Rash    • Stress    • Weight loss      Past Surgical History:   Procedure Laterality Date Oriented to person, place, and time. No distress. HEENT:  Normocephalic and atraumatic. Hearing and tympanic membranes normal.  Nose normal. Oropharynx is clear and moist.   Eyes: Conjunctivae and EOM are normal. PERRLA. No scleral icterus.    Neck: Andrea Fee Prescriptions      No prescriptions requested or ordered in this encounter       Imaging & Consults:  NEURO - INTERNAL  MRI BRAIN (CPT=70551)      No follow-ups on file.     Patient Instructions       For Caregivers: Daily Care for People With Dementia These tips can also help:  · Keep meals simple. Too many choices can be overwhelming. Try to maintain a calm, quiet atmosphere while you eat. · Place healthy snacks, such as fresh fruit, out where they can be seen.   · Watch eating habits. People with nando treated. Call the healthcare provider if you notice a sudden change in your loved one’s behavior or emotions. These changes may be due to dementia. But they could also signal other health problems that can be treated.   Allison last reviewed this educatio drinking. Offer liquids and ensure that they are taken. · Keep all medicines in a secure place under the caregiver’s control. To prevent overdose, a confused person should take medicines only under the supervision of a caregiver.   · To help a person with healthcare provider  Call [phone] or emergency services right away if any of the following occur:  · Violent behavior or behavior too hard to manage at home  · New hallucinations or delusions  · complains of severe headache or numbness or weakness of

## 2021-10-31 NOTE — PATIENT INSTRUCTIONS
For Caregivers: Daily Care for People With Dementia     Gardening can be a pleasant way to keep your loved one active. Over time, people with dementia will need more and more help with daily tasks.  These include eating meals, taking medicines, and gett they can be seen. · Watch eating habits. People with dementia may eat too little or too much. Talk to the healthcare provider if you have concerns. · Try finger foods if regular meals become too difficult for your loved one to eat.   Dressing  People with that can be treated. Allison last reviewed this educational content on 3/1/2018  © 9329-0711 [employer]. All rights reserved. This information is not intended as a substitute for professional medical care.  Always follow your healthcare pro the supervision of a caregiver. · To help a person with confusion:  ? Establish a daily routine. Change can be a source of stress for someone with confusion.  Make and keep a time schedule for common tasks such as bathing, dressing, taking medicines, meals delusions  · complains of severe headache or numbness or weakness of the face, arm, or leg  · Slurred speech or trouble speaking, walking, or seeing  · Fainting spell, dizziness, or seizure  Allison last reviewed this educational content on 3/1/2018  © 20

## 2021-12-21 RX ORDER — ESCITALOPRAM OXALATE 10 MG/1
TABLET ORAL
Qty: 90 TABLET | Refills: 0 | Status: SHIPPED | OUTPATIENT
Start: 2021-12-21

## 2022-03-17 RX ORDER — ALPRAZOLAM 1 MG/1
1 TABLET ORAL NIGHTLY PRN
Qty: 90 TABLET | Refills: 1 | Status: SHIPPED | OUTPATIENT
Start: 2022-03-17 | End: 2022-05-16

## 2022-03-17 NOTE — TELEPHONE ENCOUNTER
Requesting Alprazolam 1mg  LOV: 10/29/21  RTC: 4 weeks  Last Relevant Labs: 12/12/2020  Filled: 9/9/21 #90 with 0 refills    No future appointments.     Rx pended and routed for approval/denial

## 2022-05-14 RX ORDER — ESCITALOPRAM OXALATE 10 MG/1
TABLET ORAL
Qty: 90 TABLET | Refills: 0 | Status: SHIPPED | OUTPATIENT
Start: 2022-05-14

## 2022-05-23 ENCOUNTER — TELEPHONE (OUTPATIENT)
Dept: FAMILY MEDICINE CLINIC | Facility: CLINIC | Age: 82
End: 2022-05-23

## 2022-08-26 ENCOUNTER — OFFICE VISIT (OUTPATIENT)
Dept: FAMILY MEDICINE CLINIC | Facility: CLINIC | Age: 82
End: 2022-08-26
Payer: MEDICARE

## 2022-08-26 VITALS
OXYGEN SATURATION: 98 % | DIASTOLIC BLOOD PRESSURE: 82 MMHG | RESPIRATION RATE: 16 BRPM | BODY MASS INDEX: 18.88 KG/M2 | TEMPERATURE: 98 F | SYSTOLIC BLOOD PRESSURE: 136 MMHG | HEIGHT: 61 IN | HEART RATE: 74 BPM | WEIGHT: 100 LBS

## 2022-08-26 DIAGNOSIS — Z00.00 ENCOUNTER FOR ANNUAL HEALTH EXAMINATION: Primary | ICD-10-CM

## 2022-08-26 DIAGNOSIS — R26.89 BALANCE PROBLEM: ICD-10-CM

## 2022-08-26 DIAGNOSIS — R03.0 ELEVATED BLOOD PRESSURE READING IN OFFICE WITH WHITE COAT SYNDROME, WITHOUT DIAGNOSIS OF HYPERTENSION: ICD-10-CM

## 2022-08-26 DIAGNOSIS — Z12.5 SCREENING FOR PROSTATE CANCER: ICD-10-CM

## 2022-08-26 DIAGNOSIS — D69.6 THROMBOCYTOPENIA (HCC): ICD-10-CM

## 2022-08-26 DIAGNOSIS — E46 PROTEIN-CALORIE MALNUTRITION, UNSPECIFIED SEVERITY (HCC): ICD-10-CM

## 2022-08-26 DIAGNOSIS — F41.9 ANXIETY: ICD-10-CM

## 2022-08-26 DIAGNOSIS — Z91.81 AT MODERATE RISK FOR FALL: ICD-10-CM

## 2022-08-26 DIAGNOSIS — Z13.6 SCREENING FOR CARDIOVASCULAR CONDITION: ICD-10-CM

## 2022-08-26 PROCEDURE — 1126F AMNT PAIN NOTED NONE PRSNT: CPT | Performed by: FAMILY MEDICINE

## 2022-08-26 PROCEDURE — G0446 INTENS BEHAVE THER CARDIO DX: HCPCS | Performed by: FAMILY MEDICINE

## 2022-08-26 PROCEDURE — 99397 PER PM REEVAL EST PAT 65+ YR: CPT | Performed by: FAMILY MEDICINE

## 2022-08-26 RX ORDER — ALPRAZOLAM 1 MG/1
1 TABLET ORAL NIGHTLY PRN
Qty: 90 TABLET | Refills: 0 | Status: SHIPPED | OUTPATIENT
Start: 2022-08-26 | End: 2022-11-24

## 2022-08-26 RX ORDER — ALPRAZOLAM 1 MG/1
1 TABLET ORAL NIGHTLY PRN
COMMUNITY
Start: 2022-06-12 | End: 2022-08-26

## 2022-08-26 RX ORDER — ASPIRIN 81 MG/1
81 TABLET ORAL DAILY
Qty: 360 TABLET | Refills: 0 | COMMUNITY
Start: 2022-08-26 | End: 2023-08-26

## 2022-08-26 RX ORDER — ESCITALOPRAM OXALATE 10 MG/1
10 TABLET ORAL DAILY
Qty: 90 TABLET | Refills: 1 | Status: SHIPPED | OUTPATIENT
Start: 2022-08-26

## 2023-02-06 DIAGNOSIS — F41.9 ANXIETY: ICD-10-CM

## 2023-02-06 RX ORDER — ESCITALOPRAM OXALATE 10 MG/1
10 TABLET ORAL DAILY
Qty: 90 TABLET | Refills: 1 | Status: SHIPPED | OUTPATIENT
Start: 2023-02-06

## 2023-02-06 RX ORDER — ALPRAZOLAM 1 MG/1
TABLET ORAL
Qty: 90 TABLET | Refills: 0 | Status: SHIPPED | OUTPATIENT
Start: 2023-02-06

## 2023-02-06 NOTE — TELEPHONE ENCOUNTER
Requesting Alprazolam 1mg  LOV: 8/26/22 Physical  RTC: 1 year  Last Relevant Labs:   Filled: 8/26/22 #90 with 0 refills    Requesting Escitalopram 10mg  LOV: 8/26/22 Physical  RTC: 1 year  Last Relevant Labs:   Filled: 8/26/22 #90 with 0 refills      No future appointments.     RXs pended and routed for approval/denial

## 2023-02-14 ENCOUNTER — TELEPHONE (OUTPATIENT)
Dept: FAMILY MEDICINE CLINIC | Facility: CLINIC | Age: 83
End: 2023-02-14

## 2023-02-14 NOTE — TELEPHONE ENCOUNTER
Pt states he takes this med 3x's a day. Pt states he needs a 90 day supply taking 1MG 3x's a day. Please advise Pt. Pt does not speak English very well. Explained to Pt this med is only once a day. Pt states once a day does nothing for him.

## 2023-02-14 NOTE — TELEPHONE ENCOUNTER
Patient called the pharmacy to get Alprazolam refilled, he is out of his medication because he's taking it 3xday, they would like clarification and for us to call the patient, please advise. They also said he is getting belligerent with the pharmacy.

## 2023-02-15 NOTE — TELEPHONE ENCOUNTER
Please call the Pt to explain the need for the appt. Pt needs understand from clinical staff he is not take the medication as directed. [facility] staff with call Pt to schedule an appt to discuss with Dr. Ana Lucas.

## 2023-02-15 NOTE — TELEPHONE ENCOUNTER
Spoke with Pt daughter to offer appt to discuss medication issues. Unable to schedule at time of call. Daughter is checking with Pt and her sister to make arrangements to get Pt into the office for appt. Offered appt with Elie Prasad, first available.

## 2023-05-30 NOTE — TELEPHONE ENCOUNTER
Patient is requesting a refill on: Alprazolam 1mg #90  Last LOV: Last Refill: 8/26/22    Pended # 30 ONLY    PATIENT NEEDS APPOINTMENT    Non- protocol Medication  RX pended and routed to provider for approval

## 2023-05-31 RX ORDER — ALPRAZOLAM 1 MG/1
1 TABLET ORAL NIGHTLY PRN
Qty: 90 TABLET | Refills: 1 | Status: SHIPPED | OUTPATIENT
Start: 2023-05-31

## 2023-06-12 DIAGNOSIS — Z00.00 LABORATORY TESTS ORDERED AS PART OF A COMPLETE PHYSICAL EXAM (CPE): ICD-10-CM

## 2023-06-13 RX ORDER — TRIAMCINOLONE ACETONIDE 1 MG/G
CREAM TOPICAL
Qty: 15 G | Refills: 0 | Status: SHIPPED | OUTPATIENT
Start: 2023-06-13

## 2023-06-13 RX ORDER — LATANOPROST 50 UG/ML
1 SOLUTION/ DROPS OPHTHALMIC EVERY EVENING
COMMUNITY
Start: 2023-06-05

## 2023-06-14 ENCOUNTER — OFFICE VISIT (OUTPATIENT)
Dept: FAMILY MEDICINE CLINIC | Facility: CLINIC | Age: 83
End: 2023-06-14
Payer: MEDICARE

## 2023-06-14 VITALS
HEIGHT: 61 IN | BODY MASS INDEX: 20.58 KG/M2 | TEMPERATURE: 98 F | RESPIRATION RATE: 16 BRPM | SYSTOLIC BLOOD PRESSURE: 126 MMHG | DIASTOLIC BLOOD PRESSURE: 76 MMHG | HEART RATE: 90 BPM | OXYGEN SATURATION: 100 % | WEIGHT: 109 LBS

## 2023-06-14 DIAGNOSIS — Z91.81 AT MODERATE RISK FOR FALL: Primary | ICD-10-CM

## 2023-06-14 DIAGNOSIS — M79.89 SWELLING OF LOWER LEG: ICD-10-CM

## 2023-06-14 DIAGNOSIS — R26.89 DECREASED MOBILITY: ICD-10-CM

## 2023-06-14 DIAGNOSIS — S80.812A ABRASION OF ANTERIOR LOWER LEG, LEFT, INITIAL ENCOUNTER: ICD-10-CM

## 2023-06-14 PROCEDURE — 99214 OFFICE O/P EST MOD 30 MIN: CPT | Performed by: FAMILY MEDICINE

## 2023-06-27 ENCOUNTER — TELEPHONE (OUTPATIENT)
Dept: FAMILY MEDICINE CLINIC | Facility: CLINIC | Age: 83
End: 2023-06-27

## 2023-07-13 ENCOUNTER — TELEPHONE (OUTPATIENT)
Dept: FAMILY MEDICINE CLINIC | Facility: CLINIC | Age: 83
End: 2023-07-13

## 2023-08-23 DIAGNOSIS — F41.9 ANXIETY: ICD-10-CM

## 2023-08-23 NOTE — TELEPHONE ENCOUNTER
Pt's daughter called and said her dad needs a refill on escitalopram 10 MG Oral Tab sent to [facility] on [address].      Future Appointments   Date Time Provider Li Lawler   9/29/2023 12:30 PM Cephas Galeazzi,  [facility] [address] [facility] [address]

## 2023-08-23 NOTE — TELEPHONE ENCOUNTER
Requesting Escitalopram 10mg  LOV: 6/14/23  RTC: 6 months  Last Relevant Labs: 12/12/2020  Filled: 2/6/23 #90 with 1 refills    Future Appointments   Date Time Provider Li Lawler   9/29/2023 12:30 PM Magdaleno Self, DO [facility] [address] [facility] [address]     Non-protocol med:  Rx pended and routed for approval/denial

## 2023-08-24 RX ORDER — ESCITALOPRAM OXALATE 10 MG/1
10 TABLET ORAL DAILY
Qty: 90 TABLET | Refills: 1 | Status: SHIPPED | OUTPATIENT
Start: 2023-08-24

## 2023-08-24 RX ORDER — ESCITALOPRAM OXALATE 10 MG/1
10 TABLET ORAL DAILY
Qty: 90 TABLET | Refills: 0 | OUTPATIENT
Start: 2023-08-24

## 2023-08-24 NOTE — TELEPHONE ENCOUNTER
Requested Renewals     Name from pharmacy: Escitalopram Oxalate 10 Mg Tab Auro         Will file in chart as: ESCITALOPRAM 10 MG Oral Tab    The original prescription was reordered on 8/24/2023 by Lawanda Steele DO. Renewing this prescription may not be appropriate. Possible duplicate: Hover to review recent actions on this medication    Sig: Take 1 tablet (10 mg total) by mouth daily. Disp: 90 tablet    Refills: 0    Start: 8/23/2023    Class: Normal    Non-formulary For: Anxiety          Future Appointments   Date Time Provider \[facility]\""   9/29/2023 12:30 PM Lawanda Steele DO [facility] [address] [facility] [address]     LOV: 6/14/23  Last physical done 8/26/22  RX sent today   This RX duplicate.

## 2023-09-29 ENCOUNTER — OFFICE VISIT (OUTPATIENT)
Dept: FAMILY MEDICINE CLINIC | Facility: CLINIC | Age: 83
End: 2023-09-29
Payer: MEDICARE

## 2023-09-29 VITALS
RESPIRATION RATE: 20 BRPM | TEMPERATURE: 98 F | OXYGEN SATURATION: 97 % | DIASTOLIC BLOOD PRESSURE: 72 MMHG | HEIGHT: 61 IN | SYSTOLIC BLOOD PRESSURE: 130 MMHG | WEIGHT: 103 LBS | BODY MASS INDEX: 19.45 KG/M2 | HEART RATE: 79 BPM

## 2023-09-29 DIAGNOSIS — R26.89 BALANCE PROBLEM: ICD-10-CM

## 2023-09-29 DIAGNOSIS — K59.09 CHRONIC CONSTIPATION: ICD-10-CM

## 2023-09-29 DIAGNOSIS — Z12.5 ENCOUNTER FOR SCREENING FOR MALIGNANT NEOPLASM OF PROSTATE: ICD-10-CM

## 2023-09-29 DIAGNOSIS — E46 PROTEIN-CALORIE MALNUTRITION, UNSPECIFIED SEVERITY (HCC): ICD-10-CM

## 2023-09-29 DIAGNOSIS — Z00.00 WELL ADULT EXAM: ICD-10-CM

## 2023-09-29 DIAGNOSIS — R31.0 GROSS HEMATURIA: ICD-10-CM

## 2023-09-29 DIAGNOSIS — Z91.81 AT MODERATE RISK FOR FALL: ICD-10-CM

## 2023-09-29 DIAGNOSIS — F41.9 ANXIETY: ICD-10-CM

## 2023-09-29 DIAGNOSIS — Z28.21 IMMUNIZATION NOT CARRIED OUT BECAUSE OF PATIENT REFUSAL: ICD-10-CM

## 2023-09-29 DIAGNOSIS — Z00.00 ENCOUNTER FOR ANNUAL HEALTH EXAMINATION: Primary | ICD-10-CM

## 2023-09-29 DIAGNOSIS — D69.6 THROMBOCYTOPENIA (HCC): ICD-10-CM

## 2023-09-29 PROCEDURE — G0439 PPPS, SUBSEQ VISIT: HCPCS | Performed by: FAMILY MEDICINE

## 2023-09-29 PROCEDURE — 99213 OFFICE O/P EST LOW 20 MIN: CPT | Performed by: FAMILY MEDICINE

## 2023-09-29 PROCEDURE — 1125F AMNT PAIN NOTED PAIN PRSNT: CPT | Performed by: FAMILY MEDICINE

## 2023-09-29 RX ORDER — ESCITALOPRAM OXALATE 10 MG/1
10 TABLET ORAL DAILY
Qty: 90 TABLET | Refills: 3 | Status: SHIPPED | OUTPATIENT
Start: 2023-09-29

## 2023-09-29 RX ORDER — ALPRAZOLAM 1 MG/1
1 TABLET ORAL NIGHTLY PRN
Qty: 90 TABLET | Refills: 3 | Status: SHIPPED | OUTPATIENT
Start: 2023-09-29

## 2023-10-04 ENCOUNTER — LAB ENCOUNTER (OUTPATIENT)
Dept: LAB | Age: 83
End: 2023-10-04
Attending: FAMILY MEDICINE
Payer: MEDICARE

## 2023-10-04 DIAGNOSIS — R31.0 GROSS HEMATURIA: ICD-10-CM

## 2023-10-04 DIAGNOSIS — Z12.5 ENCOUNTER FOR SCREENING FOR MALIGNANT NEOPLASM OF PROSTATE: ICD-10-CM

## 2023-10-04 DIAGNOSIS — Z00.00 ENCOUNTER FOR ANNUAL HEALTH EXAMINATION: ICD-10-CM

## 2023-10-04 LAB
ALBUMIN SERPL-MCNC: 4.3 G/DL (ref 3.4–5)
ALBUMIN/GLOB SERPL: 1.2 {RATIO} (ref 1–2)
ALP LIVER SERPL-CCNC: 56 U/L
ALT SERPL-CCNC: 22 U/L
ANION GAP SERPL CALC-SCNC: 9 MMOL/L (ref 0–18)
AST SERPL-CCNC: 23 U/L (ref 15–37)
BASOPHILS # BLD AUTO: 0.02 X10(3) UL (ref 0–0.2)
BASOPHILS NFR BLD AUTO: 0.3 %
BILIRUB SERPL-MCNC: 1.2 MG/DL (ref 0.1–2)
BILIRUB UR QL STRIP.AUTO: NEGATIVE
BUN BLD-MCNC: 27 MG/DL (ref 7–18)
CALCIUM BLD-MCNC: 9.6 MG/DL (ref 8.5–10.1)
CHLORIDE SERPL-SCNC: 105 MMOL/L (ref 98–112)
CLARITY UR REFRACT.AUTO: CLEAR
CO2 SERPL-SCNC: 25 MMOL/L (ref 21–32)
COLOR UR AUTO: YELLOW
COMPLEXED PSA SERPL-MCNC: 2.7 NG/ML (ref ?–4)
CREAT BLD-MCNC: 0.88 MG/DL
EGFRCR SERPLBLD CKD-EPI 2021: 86 ML/MIN/1.73M2 (ref 60–?)
EOSINOPHIL # BLD AUTO: 0.01 X10(3) UL (ref 0–0.7)
EOSINOPHIL NFR BLD AUTO: 0.2 %
ERYTHROCYTE [DISTWIDTH] IN BLOOD BY AUTOMATED COUNT: 13.2 %
FASTING STATUS PATIENT QL REPORTED: YES
GLOBULIN PLAS-MCNC: 3.5 G/DL (ref 2.8–4.4)
GLUCOSE BLD-MCNC: 103 MG/DL (ref 70–99)
GLUCOSE UR STRIP.AUTO-MCNC: NORMAL MG/DL
HCT VFR BLD AUTO: 42.8 %
HGB BLD-MCNC: 13.9 G/DL
IMM GRANULOCYTES # BLD AUTO: 0.01 X10(3) UL (ref 0–1)
IMM GRANULOCYTES NFR BLD: 0.2 %
KETONES UR STRIP.AUTO-MCNC: 60 MG/DL
LEUKOCYTE ESTERASE UR QL STRIP.AUTO: NEGATIVE
LYMPHOCYTES # BLD AUTO: 1.87 X10(3) UL (ref 1–4)
LYMPHOCYTES NFR BLD AUTO: 31.3 %
MCH RBC QN AUTO: 31 PG (ref 26–34)
MCHC RBC AUTO-ENTMCNC: 32.5 G/DL (ref 31–37)
MCV RBC AUTO: 95.3 FL
MONOCYTES # BLD AUTO: 0.61 X10(3) UL (ref 0.1–1)
MONOCYTES NFR BLD AUTO: 10.2 %
NEUTROPHILS # BLD AUTO: 3.46 X10 (3) UL (ref 1.5–7.7)
NEUTROPHILS # BLD AUTO: 3.46 X10(3) UL (ref 1.5–7.7)
NEUTROPHILS NFR BLD AUTO: 57.8 %
NITRITE UR QL STRIP.AUTO: NEGATIVE
OSMOLALITY SERPL CALC.SUM OF ELEC: 293 MOSM/KG (ref 275–295)
PH UR STRIP.AUTO: 5.5 [PH] (ref 5–8)
PLATELET # BLD AUTO: 163 10(3)UL (ref 150–450)
POTASSIUM SERPL-SCNC: 4.2 MMOL/L (ref 3.5–5.1)
PROT SERPL-MCNC: 7.8 G/DL (ref 6.4–8.2)
PROT UR STRIP.AUTO-MCNC: 30 MG/DL
RBC # BLD AUTO: 4.49 X10(6)UL
SODIUM SERPL-SCNC: 139 MMOL/L (ref 136–145)
SP GR UR STRIP.AUTO: 1.02 (ref 1–1.03)
UROBILINOGEN UR STRIP.AUTO-MCNC: NORMAL MG/DL
WBC # BLD AUTO: 6 X10(3) UL (ref 4–11)

## 2023-10-04 PROCEDURE — 85025 COMPLETE CBC W/AUTO DIFF WBC: CPT

## 2023-10-04 PROCEDURE — 80053 COMPREHEN METABOLIC PANEL: CPT

## 2023-10-04 PROCEDURE — 81001 URINALYSIS AUTO W/SCOPE: CPT

## 2023-10-04 PROCEDURE — 36415 COLL VENOUS BLD VENIPUNCTURE: CPT

## 2023-10-06 ENCOUNTER — TELEPHONE (OUTPATIENT)
Dept: FAMILY MEDICINE CLINIC | Facility: CLINIC | Age: 83
End: 2023-10-06

## 2023-10-06 DIAGNOSIS — R31.9 HEMATURIA, UNSPECIFIED TYPE: Primary | ICD-10-CM

## 2023-10-06 NOTE — TELEPHONE ENCOUNTER
The PSA is normal.  The UA showed trace blood. The rest of the urinalysis was basically normal therefore it is unlikely that a UTI is present. Is he having any symptoms of pain, urgency or frequency, fever? Does he have symptoms of flank pain or abdominal pain that would suggest a kidney stone? I do not think that the blood is related to a UTI otherwise the rest of his UA would be abnormal and he would be symptomatic.       In this case there are many other causes of blood in the urinalysis and I would suggest that we do a repeat urinalysis and 2 weeks to see if this is just a transient hematuria or does he need further work-up with a urologist.

## 2023-10-06 NOTE — TELEPHONE ENCOUNTER
Daughter calling, patient did had UA done on 10-4. Daughter concerned about poss infection, will like for patient to be prescribed antibiotic if needed.

## 2023-10-06 NOTE — TELEPHONE ENCOUNTER
Spoke to daughter of patient. Daughter advised on results. Daughter verbalized understanding. Denies flank pain, dysuria, confusion, fever, abdominal pain, n/v.   Daughter would still like patient to be seen by urology and at least get appt scheduled for the blood in urine. [facility] sent with urology info. Order placed for UA.

## 2023-10-18 ENCOUNTER — LAB ENCOUNTER (OUTPATIENT)
Dept: LAB | Age: 83
End: 2023-10-18
Attending: FAMILY MEDICINE
Payer: MEDICARE

## 2023-10-18 DIAGNOSIS — R31.9 HEMATURIA, UNSPECIFIED TYPE: ICD-10-CM

## 2023-10-18 LAB
BILIRUB UR QL STRIP.AUTO: NEGATIVE
CLARITY UR REFRACT.AUTO: CLEAR
COLOR UR AUTO: YELLOW
GLUCOSE UR STRIP.AUTO-MCNC: NORMAL MG/DL
KETONES UR STRIP.AUTO-MCNC: NEGATIVE MG/DL
LEUKOCYTE ESTERASE UR QL STRIP.AUTO: NEGATIVE
NITRITE UR QL STRIP.AUTO: NEGATIVE
PH UR STRIP.AUTO: 5.5 [PH] (ref 5–8)
PROT UR STRIP.AUTO-MCNC: NEGATIVE MG/DL
RBC UR QL AUTO: NEGATIVE
SP GR UR STRIP.AUTO: 1.02 (ref 1–1.03)
UROBILINOGEN UR STRIP.AUTO-MCNC: NORMAL MG/DL

## 2023-10-18 PROCEDURE — 81003 URINALYSIS AUTO W/O SCOPE: CPT

## 2023-10-24 DIAGNOSIS — R31.0 GROSS HEMATURIA: Primary | ICD-10-CM

## 2024-05-06 ENCOUNTER — TELEPHONE (OUTPATIENT)
Dept: FAMILY MEDICINE CLINIC | Facility: CLINIC | Age: 84
End: 2024-05-06

## 2024-05-06 NOTE — TELEPHONE ENCOUNTER
Pts daughter requesting to speak with medical team regarding a recent hospital stay patient had.     Per pts daughter he suffered a fall on Saturday and had to stay over night at [facility]. Pts daughter wants to discuss pts anxiety and medications. This PSR offered to schedule an office visit and she declined stating that she would rather speak with Medical team before scheduling an appointment.     Please call back and advise.

## 2024-05-07 NOTE — TELEPHONE ENCOUNTER
Left message on machine for patient's daughter to call office to schedule an appointment for hospital follow up-can be with Jaun, the nurse practitioner.

## 2024-06-06 ENCOUNTER — TELEPHONE (OUTPATIENT)
Dept: FAMILY MEDICINE CLINIC | Facility: CLINIC | Age: 84
End: 2024-06-06

## 2024-06-06 NOTE — TELEPHONE ENCOUNTER
Received fax from [facility] for most recent progress notes on patient. Faxed office visit notes from 5/13/24 to [phone].

## 2024-07-08 ENCOUNTER — TELEPHONE (OUTPATIENT)
Dept: FAMILY MEDICINE CLINIC | Facility: CLINIC | Age: 84
End: 2024-07-08

## 2024-07-08 NOTE — TELEPHONE ENCOUNTER
Patient's daughter called, he has blood in his urine and burns when urinating, most likely UTI, hard to bring him in or even talk on the phone, speak Serbian. Would like advice on how to handle.

## 2024-07-08 NOTE — TELEPHONE ENCOUNTER
Spoke to daughter. States dad had blood in urine this morning. Now experiencing burning and discomfort. Becoming anxious. Advised daughter to take patient to the immediate care for further evaluation. Daughter verbalizes understanding but will inquire about other options in how to proceed. Scheduled patient with nurse practitioner per daughter's request.    Future Appointments   Date Time Provider Department Center   7/9/2024 10:00 AM Jaun Christian APRN [facility] [address] [facility] [address]   7/11/2024  4:30 PM Remington Rodríguez MD [facility]   9/30/2024  3:00 PM Hayden Self DO [facility] [address] [facility] [address]

## 2024-08-20 ENCOUNTER — OFFICE VISIT (OUTPATIENT)
Dept: FAMILY MEDICINE CLINIC | Facility: CLINIC | Age: 84
End: 2024-08-20
Payer: MEDICARE

## 2024-08-20 VITALS
HEIGHT: 61 IN | TEMPERATURE: 98 F | RESPIRATION RATE: 16 BRPM | DIASTOLIC BLOOD PRESSURE: 80 MMHG | HEART RATE: 84 BPM | WEIGHT: 111 LBS | SYSTOLIC BLOOD PRESSURE: 138 MMHG | BODY MASS INDEX: 20.96 KG/M2 | OXYGEN SATURATION: 98 %

## 2024-08-20 DIAGNOSIS — R01.1 HEART MURMUR: ICD-10-CM

## 2024-08-20 DIAGNOSIS — F41.9 ANXIETY: ICD-10-CM

## 2024-08-20 DIAGNOSIS — R31.0 GROSS HEMATURIA: Primary | ICD-10-CM

## 2024-08-20 LAB
BILIRUB UR QL STRIP.AUTO: NEGATIVE
CLARITY UR REFRACT.AUTO: CLEAR
COLOR UR AUTO: YELLOW
GLUCOSE UR STRIP.AUTO-MCNC: NORMAL MG/DL
KETONES UR STRIP.AUTO-MCNC: NEGATIVE MG/DL
LEUKOCYTE ESTERASE UR QL STRIP.AUTO: NEGATIVE
NITRITE UR QL STRIP.AUTO: NEGATIVE
PH UR STRIP.AUTO: 6 [PH] (ref 5–8)
PROT UR STRIP.AUTO-MCNC: NEGATIVE MG/DL
RBC UR QL AUTO: NEGATIVE
SP GR UR STRIP.AUTO: 1.02 (ref 1–1.03)
UROBILINOGEN UR STRIP.AUTO-MCNC: 2 MG/DL

## 2024-08-20 PROCEDURE — 81003 URINALYSIS AUTO W/O SCOPE: CPT | Performed by: FAMILY MEDICINE

## 2024-08-20 PROCEDURE — 99213 OFFICE O/P EST LOW 20 MIN: CPT | Performed by: FAMILY MEDICINE

## 2024-08-20 PROCEDURE — G2211 COMPLEX E/M VISIT ADD ON: HCPCS | Performed by: FAMILY MEDICINE

## 2024-08-20 RX ORDER — ESCITALOPRAM OXALATE 20 MG/1
20 TABLET ORAL DAILY
Qty: 90 TABLET | Refills: 2 | Status: SHIPPED | OUTPATIENT
Start: 2024-08-20 | End: 2025-05-17

## 2024-08-20 NOTE — PATIENT INSTRUCTIONS
SEE UROLOGY  SCHEDULE ULTRASOUND KIDNEY/BLADDER  SCHEDULE ECHO   COMPLETE BLOODWORK  CALL OUR OFFICE IF YOU HAVE QUESTIONS OR NEED ASSISTANCE

## 2024-08-20 NOTE — PROGRESS NOTES
Subjective:      Sathya Ramos is a 83 year old male who is here for recurrent episode of gross hematuria. First incidence was 9 months ago, about three months ago he had another episode and went to urgent care.per daughter,   He gets gross hematuria once every 3-4 months- couple drops and this lasttime loooked like \"cranberry juice\" in the bowl. He was given urologoy referral last year but did not follow through. Also did not do CTAP. Needs to schedule with urology.   No passage of clots. He denies pain, denies dysuria, pelvic pain, abdominal pain, back pain.   Denies hx of kidney stone. There is not a history of urologic trauma. Other urologic symptoms include slow stream, hesitancy, nocturia, sense of residual urine.    No hx of smoking.    Patient denies history of  surgeries, tobacco use, and urolithiasis. Prior workup has been UA'S.  The following portions of the patient's history were reviewed and updated as appropriate: allergies, current medications, past family history, past medical history, past social history, past surgical history, and problem list.       Has gained weight.   Denies abdominal or pelvic pain.   He does awaken 3 times at night due to nocturia.   No smoking hx.   Takes asa.     He declined to have CTAP   Agrees to US kidney bladder.   Daughter admits he is stubborn.     Daughter thinks he is very sleepiy with the lexapro- dose reduced to 10mg per day.     He is at home alone during the day.   While daughter is at work.   He is more fatigued and sleeps through the day.     Daughter says he refuses to have caregiver.   'he has fallen twice, he refuses to do therapy or have residential HH.     Pressure at home is 110-80. He has some white coat. Daughter checks it at home using arm cuff.     HISTORY:  Past Medical History:    Anxiety    Anxiety state, unspecified    Glaucoma    History of depression    Rash    Stress    Weight loss      Past Surgical History:   Procedure Laterality Date     Hernia surgery      Repair ing hernia,5+y/o,reducibl        Family History   Problem Relation Age of Onset    High Blood Pressure Father     Stroke Mother     High Cholesterol Brother     Diabetes Brother       Social History     Socioeconomic History    Marital status:    Tobacco Use    Smoking status: Never     Passive exposure: Never    Smokeless tobacco: Never   Vaping Use    Vaping status: Never Used   Substance and Sexual Activity    Alcohol use: No    Drug use: No    Sexual activity: Not Currently          Review of Systems  Pertinent items are noted in HPI.      Objective:      /80   Pulse 84   Temp 98 °F (36.7 °C) (Temporal)   Resp 16   Ht 5' 1\" (1.549 m)   Wt 111 lb (50.3 kg)   SpO2 98%   BMI 20.97 kg/m²     General Appearance:    Alert, cooperative, no distress, appears stated age   Head:    Normocephalic, without obvious abnormality, atraumatic   Back:      no CVA tenderness   Lungs:     Clear to auscultation bilaterally, respirations unlabored   Heart:    Regular rate and rhythm, S1 and S2 normal, + systolic murmur, rub   or gallop   Abdomen:     Soft, non-tender, bowel sounds active all four quadrants,     no masses, no organomegaly       Lab Review  Patient's urine sample set to lab.   Lab Results   Component Value Date    WBC 6.0 10/04/2023    HGB 13.9 10/04/2023    HCT 42.8 10/04/2023    MCV 95.3 10/04/2023    .0 10/04/2023     Lab Results   Component Value Date    BUN 27 (H) 10/04/2023         Assessment:      gross hematuria      Plan:   Ddx: prostatitis,  malignancy, renal mass, nephritis  Orders:   Renal Ultrasound, Repeat U/A, Comprehensive Metabolic Profile d CBC, and CT scan abdomen and pelvis with contrast was ordered but he refuses to get this done    1. Gross hematuria  - UA/M WITH CULTURE REFLEX [3020]; Future  - PSA, Total - Diagnostic [E]; Future  - CBC With Differential With Platelet; Future  - Urology Referral - In Network  -  KIDNEY/BLADDER  (CPT=76770); Future  - UA/M WITH CULTURE REFLEX [3020]    2. Anxiety  - escitalopram 20 MG Oral Tab; Take 1 tablet (20 mg total) by mouth daily.  Dispense: 90 tablet; Refill: 2    3. Heart murmur  Noted on exam   - CARD ECHO 2D DOPPLER (CPT=93306); Future    Hayden Self,         This note was prepared using Dragon Medical voice recognition dictation software. As a result errors may occur. When identified these errors have been corrected. While every attempt is made to correct errors during dictation discrepancies may still exist.      Note to patient: The 21st Century Cures Act makes medical notes like these available to patients in the interest of transparency. However, be advised this is a medical document. It is intended as peer to peer communication. It is written in medical language and may contain abbreviations or verbiage that are unfamiliar. It may appear blunt or direct. Medical documents are intended to carry relevant information, facts as evident, and the clinical opinion of the practitioner.

## 2024-08-28 ENCOUNTER — LAB ENCOUNTER (OUTPATIENT)
Dept: LAB | Age: 84
End: 2024-08-28
Attending: FAMILY MEDICINE
Payer: MEDICARE

## 2024-08-28 DIAGNOSIS — D69.6 THROMBOCYTOPENIA (HCC): ICD-10-CM

## 2024-08-28 DIAGNOSIS — R97.0 ELEVATED CEA: ICD-10-CM

## 2024-08-28 DIAGNOSIS — R31.0 GROSS HEMATURIA: ICD-10-CM

## 2024-08-28 LAB
ALBUMIN SERPL-MCNC: 4.6 G/DL (ref 3.2–4.8)
ALBUMIN/GLOB SERPL: 1.5 {RATIO} (ref 1–2)
ALP LIVER SERPL-CCNC: 69 U/L
ALT SERPL-CCNC: 23 U/L
ANION GAP SERPL CALC-SCNC: 3 MMOL/L (ref 0–18)
AST SERPL-CCNC: 29 U/L (ref ?–34)
BASOPHILS # BLD AUTO: 0.02 X10(3) UL (ref 0–0.2)
BASOPHILS NFR BLD AUTO: 0.4 %
BILIRUB SERPL-MCNC: 0.8 MG/DL (ref 0.2–1.1)
BUN BLD-MCNC: 18 MG/DL (ref 9–23)
CALCIUM BLD-MCNC: 10.1 MG/DL (ref 8.7–10.4)
CHLORIDE SERPL-SCNC: 105 MMOL/L (ref 98–112)
CO2 SERPL-SCNC: 31 MMOL/L (ref 21–32)
CREAT BLD-MCNC: 0.88 MG/DL
EGFRCR SERPLBLD CKD-EPI 2021: 85 ML/MIN/1.73M2 (ref 60–?)
EOSINOPHIL # BLD AUTO: 0.11 X10(3) UL (ref 0–0.7)
EOSINOPHIL NFR BLD AUTO: 2 %
ERYTHROCYTE [DISTWIDTH] IN BLOOD BY AUTOMATED COUNT: 13 %
FASTING STATUS PATIENT QL REPORTED: YES
GLOBULIN PLAS-MCNC: 3.1 G/DL (ref 2–3.5)
GLUCOSE BLD-MCNC: 90 MG/DL (ref 70–99)
HCT VFR BLD AUTO: 41.9 %
HGB BLD-MCNC: 13.7 G/DL
IMM GRANULOCYTES # BLD AUTO: 0 X10(3) UL (ref 0–1)
IMM GRANULOCYTES NFR BLD: 0 %
LYMPHOCYTES # BLD AUTO: 2.61 X10(3) UL (ref 1–4)
LYMPHOCYTES NFR BLD AUTO: 47.5 %
MCH RBC QN AUTO: 31.4 PG (ref 26–34)
MCHC RBC AUTO-ENTMCNC: 32.7 G/DL (ref 31–37)
MCV RBC AUTO: 96.1 FL
MONOCYTES # BLD AUTO: 0.62 X10(3) UL (ref 0.1–1)
MONOCYTES NFR BLD AUTO: 11.3 %
NEUTROPHILS # BLD AUTO: 2.14 X10 (3) UL (ref 1.5–7.7)
NEUTROPHILS # BLD AUTO: 2.14 X10(3) UL (ref 1.5–7.7)
NEUTROPHILS NFR BLD AUTO: 38.8 %
OSMOLALITY SERPL CALC.SUM OF ELEC: 289 MOSM/KG (ref 275–295)
PLATELET # BLD AUTO: 177 10(3)UL (ref 150–450)
POTASSIUM SERPL-SCNC: 4.6 MMOL/L (ref 3.5–5.1)
PROT SERPL-MCNC: 7.7 G/DL (ref 5.7–8.2)
PSA SERPL-MCNC: 1.82 NG/ML (ref ?–4)
RBC # BLD AUTO: 4.36 X10(6)UL
SODIUM SERPL-SCNC: 139 MMOL/L (ref 136–145)
WBC # BLD AUTO: 5.5 X10(3) UL (ref 4–11)

## 2024-08-28 PROCEDURE — 85025 COMPLETE CBC W/AUTO DIFF WBC: CPT

## 2024-08-28 PROCEDURE — 36415 COLL VENOUS BLD VENIPUNCTURE: CPT

## 2024-08-28 PROCEDURE — 80053 COMPREHEN METABOLIC PANEL: CPT

## 2024-08-28 PROCEDURE — 84153 ASSAY OF PSA TOTAL: CPT

## 2024-10-16 ENCOUNTER — HOSPITAL ENCOUNTER (OUTPATIENT)
Dept: CT IMAGING | Age: 84
Discharge: HOME | End: 2024-10-16
Attending: UROLOGY
Payer: MEDICARE

## 2024-10-16 DIAGNOSIS — R31.0 GROSS HEMATURIA: ICD-10-CM

## 2024-10-16 PROCEDURE — 74178 CT ABD&PLV WO CNTR FLWD CNTR: CPT | Performed by: UROLOGY

## 2024-10-16 PROCEDURE — 76377 3D RENDER W/INTRP POSTPROCES: CPT | Performed by: UROLOGY

## 2024-10-19 DIAGNOSIS — F41.9 ANXIETY: ICD-10-CM

## 2024-10-21 NOTE — TELEPHONE ENCOUNTER
Requesting Alprazolam 1mg  Last OV: 8/20/24  RTC: prn  Last Rx'd 5/13/24 #60 with 0 refills    No future appointments.    Per [place] , last dispensed 5/13/24 #60    Controlled med:  Rx pended and routed for approval/denial

## 2024-10-23 RX ORDER — ALPRAZOLAM 1 MG/1
1 TABLET ORAL NIGHTLY PRN
Qty: 90 TABLET | Refills: 0 | Status: SHIPPED | OUTPATIENT
Start: 2024-10-23

## 2024-10-29 ENCOUNTER — LAB ENCOUNTER (OUTPATIENT)
Dept: LAB | Age: 84
End: 2024-10-29
Attending: FAMILY MEDICINE
Payer: MEDICARE

## 2024-10-29 ENCOUNTER — TELEPHONE (OUTPATIENT)
Dept: FAMILY MEDICINE CLINIC | Facility: CLINIC | Age: 84
End: 2024-10-29

## 2024-10-29 DIAGNOSIS — R35.0 FREQUENT URINATION: Primary | ICD-10-CM

## 2024-10-29 DIAGNOSIS — R35.0 FREQUENT URINATION: ICD-10-CM

## 2024-10-29 PROCEDURE — 87086 URINE CULTURE/COLONY COUNT: CPT

## 2024-10-29 PROCEDURE — 81003 URINALYSIS AUTO W/O SCOPE: CPT

## 2024-10-29 NOTE — TELEPHONE ENCOUNTER
Patient's daughter called and said her dad is still having frequent urination.  He can't completely empty his bladder. He does have a history of bladder infections.  She wanted to make a phone visit appointment with Dr. Wahl but noting available in the time brackets she needs.  She needs the call to come after 3:00 because she is at work and her dad is at home.  She wants him to get a urine sample to make sure he does not have a UTI.  She also wants Dr. Self to look over her dad's chart so she is ordering exactly what needs to be ordered   The patient has been examined and the H&P has been reviewed:    I concur with the findings and no changes have occurred since H&P was written.    KUB today  Interval placement of left-sided ureteral stent.  No focal calcification to suggest nephrolithiasis or urolithiasis.    There are no hospital problems to display for this patient.

## 2024-11-07 ENCOUNTER — TELEPHONE (OUTPATIENT)
Dept: FAMILY MEDICINE CLINIC | Facility: CLINIC | Age: 84
End: 2024-11-07

## 2024-11-20 ENCOUNTER — TELEPHONE (OUTPATIENT)
Dept: FAMILY MEDICINE CLINIC | Facility: CLINIC | Age: 84
End: 2024-11-20
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
## 2024-11-20 NOTE — TELEPHONE ENCOUNTER
Called patient with results/recommendations stated below.  Patient verbalized understanding.  Patient will discuss with his wife and call us back.  Will await call back.   Pts daughter calling stating that she will be dropping off some paperwork for MD to fill out so that she can take time off of work when needed bc she takes care of her father.     Patient fell again last night hurt his head, was taken to the ER and discharged. Pts daughter states that patient is really weak needs help. Does MD recommend anything.     Please advise.
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
## 2024-11-21 NOTE — TELEPHONE ENCOUNTER
Pts daughter called stating that patient is not well he is not himself since his fall. Pts daughter wants to know if patient can have a telephone appointment today instead of tomorrow.     They are worried bc patient seems very confused.     Please advise.

## 2024-11-21 NOTE — TELEPHONE ENCOUNTER
Future Appointments   Date Time Provider Department Center   11/22/2024 10:00 AM Hayden Self,  [facility] [address] [facility] [address]

## 2024-11-22 ENCOUNTER — VIRTUAL PHONE E/M (OUTPATIENT)
Dept: FAMILY MEDICINE CLINIC | Facility: CLINIC | Age: 84
End: 2024-11-22
Payer: MEDICARE

## 2024-11-22 DIAGNOSIS — Z74.09 IMMOBILITY: Primary | ICD-10-CM

## 2024-11-22 NOTE — PROGRESS NOTES
VIRTUAL/TELEPHONE ENCOUNTER    Subjective    This visit is conducted using live telephone encounter with patient and his daughter.     Chief Complaint:     Chief Complaint   Patient presents with    Follow - Up         The patient/daughter confirmed knowledge of the limitations of the use of telemedicine were verbally confirmed by the provider.  Verification of patient identity was established.  Verbal consent was obtained for medical treatment    HPI:  84-year-old male with a past medical history of thrombocytopenia, malnutrition, severe anxiety presenting with his daughter on the phone although most of the conversation was with the daughter regarding his recent condition.  He has been showing signs of progressive weakness, fatigue, trouble walking due to his weakness and confusion.  Per his daughter this started about a week or so ago.  Couple days ago he had a fall at home and he was taken to [facility].  There he had a CT scan of the head which was clear, per daughter.  She does not recall him having any blood work done.  He has also been having more urinary accidents in the last couple weeks.  Has been more confused.  At [facility] he had 2 staples placed on his head.  Daughter is unsure of when he needs to go back to get the staples removed and they said it would be about 7 to 10 days.  The concern for him is his safety and confusion.  Although as of the last 24 hours he has been able to walk better with his cane and he has been able to converse more coherently.  He stays home alone during the daytime and his daughter is at work.  They are contacting health services to get a caretaker for him.  In the meantime they are requesting home health to evaluate him for vitals and physical therapy.  I advised that he be taken to the ER due to his cognitive state but at this time patient has been declining and daughters are not able to take him because it is difficult to get him into the car.  I explained that he  will probably need to go to the ER via EMS.  They declined.     Review of Systems   Unable to complete, daughter did the visit.     Objective    Physical Exam:  Unable to complete with patient as daughter did the visit.     Assessment/Plan:    1. Immobility  Referred to HH per family request  He recent had fall at home with cognitive and physical decline.   He likely needs another ER evaluation or in office evaluation but family can't bring him in. I suggested calling EMS. Patient refuses to go to ER or come into the office.   - [facility] HOME [facility]ALTH REFERRAL      Diagnostic rationale, follow up instructions, and strict precautions/indications for emergent direct evaluation were discussed with the patient. The patient agrees with the plan, and understands to follow up with their primary care physician or other healthcare provider within 48-72 hours for reevaluation for persistent or worsening symptoms.   . Patient understands phone evaluation is not a substitute for face-to-face examination or emergency care. Patient advised to go to ER or call 911 for worsening symptoms or acute distress.           Virtual/Telephone Check-In    Patient  verbally consents to a Virtual/Telephone Check-In service on 11/23/24    Patient understands and accepts financial responsibility for any deductible, co-insurance and/or co-pays associated with this service.     Duration of the service: 11 minutes were spent with the patient           Hayden Self DO

## 2024-11-26 ENCOUNTER — TELEMEDICINE (OUTPATIENT)
Dept: FAMILY MEDICINE CLINIC | Facility: CLINIC | Age: 84
End: 2024-11-26
Payer: MEDICARE

## 2024-11-26 DIAGNOSIS — R29.6 RECURRENT FALLS: Primary | ICD-10-CM
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
## 2024-11-26 DIAGNOSIS — F41.9 ANXIETY: ICD-10-CM

## 2024-11-26 DIAGNOSIS — R53.1 WEAKNESS: ICD-10-CM

## 2024-11-26 PROCEDURE — G2211 COMPLEX E/M VISIT ADD ON: HCPCS | Performed by: FAMILY MEDICINE

## 2024-11-26 PROCEDURE — 99213 OFFICE O/P EST LOW 20 MIN: CPT | Performed by: FAMILY MEDICINE

## 2024-11-26 RX ORDER — BUSPIRONE HYDROCHLORIDE 5 MG/1
5 TABLET ORAL 3 TIMES DAILY
Qty: 270 TABLET | Refills: 0 | Status: SHIPPED | OUTPATIENT
Start: 2024-11-26 | End: 2025-02-24

## 2024-11-26 NOTE — PROGRESS NOTES
Subjective    This visit is conducted using Telemedicine with live, interactive video and audio.    Chief Complaint:  Chief Complaint   Patient presents with    Follow - Up         The patient confirmed knowledge of the limitations of the use of telemedicine were verbally confirmed by the provider.  Verification of patient identity was established.  Patient understands and accepts financial responsibility for any deductible, co-insurance and/or co-pays associated with this service.    HPI:  83 y/o anxiety, thrombocytopenia, elevated BP here for VV about his recent fall. Patient had three falls int he last month. He has a walker. Trying to get a wheelchair. Since his last visit he has been walking more with walker assist and his family.  Confusion is better. He is having a hard time finding strength to lift himself. Home health order was placed. Daughter reports he is depressed. He is on lexapro 20mg every day. Will start him on buspar today.   No urinary symptoms.   Denies fevers, chills, headache, nausea or emesis.   ER notified him that they can have bloodwork done at home and staples removed. Will order bloodwork.   He is weak, unsteady.     Review of Systems   Constitutional: Negative for fever, chills. No distress.  Eyes: Negative for pain and visual disturbance.   Respiratory: Negative for cough, chest tightness, shortness of breath and wheezing.    Cardiovascular: Negative for chest pain, palpitations and leg swelling.   Gastrointestinal: Negative for nausea, vomiting, abdominal pain   Genitourinary: Negative for dysuria, hematuria and difficulty urinating.   Musculoskeletal: positive for  arthralgias and gait problem.   Skin: Negative for color change and rash.   Neurological: positive for dizziness, weakness  Negative for numbness, tingling and headaches.      HISTORY:  Past Medical History:    Anxiety    Anxiety state, unspecified    Glaucoma    History of depression    Rash    Stress    Weight loss       Past Surgical History:   Procedure Laterality Date    Hernia surgery      Repair ing hernia,5+y/o,reducibl        Family History   Problem Relation Age of Onset    High Blood Pressure Father     Stroke Mother     High Cholesterol Brother     Diabetes Brother       Social History     Socioeconomic History    Marital status:    Tobacco Use    Smoking status: Never     Passive exposure: Never    Smokeless tobacco: Never   Vaping Use    Vaping status: Never Used   Substance and Sexual Activity    Alcohol use: No    Drug use: No    Sexual activity: Not Currently              Objective    Physical Exam:  alert, cooperative, no distress, pale, and slowed mentation, Speaking in full sentences comfortably, Normal work of breathing  Neuro: Alert/oriented x3, speech is fluent, face symmetric  Psych: Mood is stable, Affect appropriate    Assessment/Plan:    83 y/o M here with recurrent falls, see previous note as well. Daughter unable to bring him into the office or to the ER for follow up assessment. Staples to be removed by nurse set up by [facility] who will go to his home. Today I have ordered blood tests as below. I also asked daughter to schedule him a CT brain due to multiple falls- although the [facility] did perform this and per daughter it was normal. We have requested those records. For now, he seems to be doing better. [facility] to see the patient- order was placed.       1. Weakness    2. Fall, subsequent encounter  - CBC With Differential With Platelet; Future  - Comp Metabolic Panel (14); Future  - Urinalysis with Culture Reflex; Future  - CT BRAIN OR HEAD (CPT=70450); Future    3. Recurrent falls  - CBC With Differential With Platelet; Future  - Comp Metabolic Panel (14); Future  - Urinalysis with Culture Reflex; Future  - CT BRAIN OR HEAD (CPT=70450); Future    4. Anxiety  Continue on lexapro,   Started on Buspar.   F/u in the office or VV 2-4 weeks.   - busPIRone 5 MG Oral Tab; Take 1 tablet (5 mg  total) by mouth 3 (three) times daily.  Dispense: 270 tablet; Refill: 0    Diagnostic rationale, follow up instructions, and strict precautions/indications for emergent direct evaluation were discussed with the patient. The patient agrees with the plan, and understands to follow up with their primary care physician or other healthcare provider within 48-72 hours for reevaluation for persistent or worsening symptoms.    Patient understands phone evaluation is not a substitute for face-to-face examination or emergency care. Patient advised to go to ER or call 911 for worsening symptoms or acute distress.         Hayden Self DO

## 2024-12-01 ENCOUNTER — MED REC SCAN ONLY (OUTPATIENT)
Dept: FAMILY MEDICINE CLINIC | Facility: CLINIC | Age: 84
End: 2024-12-01

## 2024-12-05 ENCOUNTER — TELEPHONE (OUTPATIENT)
Dept: FAMILY MEDICINE CLINIC | Facility: CLINIC | Age: 84
End: 2024-12-05

## 2024-12-05 PROBLEM — R53.1 WEAKNESS: Status: ACTIVE | Noted: 2024-12-05

## 2025-04-14 ENCOUNTER — TELEPHONE (OUTPATIENT)
Dept: FAMILY MEDICINE CLINIC | Facility: CLINIC | Age: 85
End: 2025-04-14

## 2025-04-14 NOTE — TELEPHONE ENCOUNTER
Patient's daughter sent MyChart about her dad passing away in January. Please verify and update chart.